# Patient Record
Sex: MALE | Race: WHITE | URBAN - METROPOLITAN AREA
[De-identification: names, ages, dates, MRNs, and addresses within clinical notes are randomized per-mention and may not be internally consistent; named-entity substitution may affect disease eponyms.]

---

## 2022-02-11 ENCOUNTER — INPATIENT (INPATIENT)
Facility: HOSPITAL | Age: 42
LOS: 4 days | Discharge: HOME | DRG: 200 | End: 2022-02-16
Attending: SURGERY | Admitting: SURGERY
Payer: COMMERCIAL

## 2022-02-11 VITALS — WEIGHT: 214.95 LBS | HEIGHT: 73 IN

## 2022-02-11 DIAGNOSIS — Z98.84 BARIATRIC SURGERY STATUS: Chronic | ICD-10-CM

## 2022-02-11 DIAGNOSIS — Z95.828 PRESENCE OF OTHER VASCULAR IMPLANTS AND GRAFTS: Chronic | ICD-10-CM

## 2022-02-11 DIAGNOSIS — J93.9 PNEUMOTHORAX, UNSPECIFIED: ICD-10-CM

## 2022-02-11 LAB
ALBUMIN SERPL ELPH-MCNC: 3.2 G/DL — LOW (ref 3.3–5)
ALBUMIN SERPL ELPH-MCNC: 3.2 G/DL — LOW (ref 3.3–5)
ALBUMIN SERPL ELPH-MCNC: 3.4 G/DL — SIGNIFICANT CHANGE UP (ref 3.3–5)
ALP SERPL-CCNC: 58 U/L — SIGNIFICANT CHANGE UP (ref 40–120)
ALP SERPL-CCNC: 64 U/L — SIGNIFICANT CHANGE UP (ref 40–120)
ALP SERPL-CCNC: 68 U/L — SIGNIFICANT CHANGE UP (ref 40–120)
ALT FLD-CCNC: 111 U/L — HIGH (ref 12–78)
ALT FLD-CCNC: 97 U/L — HIGH (ref 12–78)
ALT FLD-CCNC: 99 U/L — HIGH (ref 12–78)
ANION GAP SERPL CALC-SCNC: 5 MMOL/L — SIGNIFICANT CHANGE UP (ref 5–17)
ANION GAP SERPL CALC-SCNC: 6 MMOL/L — SIGNIFICANT CHANGE UP (ref 5–17)
ANION GAP SERPL CALC-SCNC: 6 MMOL/L — SIGNIFICANT CHANGE UP (ref 5–17)
APTT BLD: 26 SEC — LOW (ref 27.5–35.5)
AST SERPL-CCNC: 124 U/L — HIGH (ref 15–37)
AST SERPL-CCNC: 125 U/L — HIGH (ref 15–37)
AST SERPL-CCNC: 165 U/L — HIGH (ref 15–37)
BASOPHILS # BLD AUTO: 0.06 K/UL — SIGNIFICANT CHANGE UP (ref 0–0.2)
BASOPHILS NFR BLD AUTO: 0.9 % — SIGNIFICANT CHANGE UP (ref 0–2)
BILIRUB DIRECT SERPL-MCNC: 0.5 MG/DL — HIGH (ref 0–0.3)
BILIRUB DIRECT SERPL-MCNC: 0.6 MG/DL — HIGH (ref 0–0.3)
BILIRUB INDIRECT FLD-MCNC: 1.3 MG/DL — HIGH (ref 0.2–1)
BILIRUB INDIRECT FLD-MCNC: 1.4 MG/DL — HIGH (ref 0.2–1)
BILIRUB SERPL-MCNC: 1.8 MG/DL — HIGH (ref 0.2–1.2)
BILIRUB SERPL-MCNC: 2 MG/DL — HIGH (ref 0.2–1.2)
BILIRUB SERPL-MCNC: 2 MG/DL — HIGH (ref 0.2–1.2)
BUN SERPL-MCNC: 20 MG/DL — SIGNIFICANT CHANGE UP (ref 7–23)
BUN SERPL-MCNC: 20 MG/DL — SIGNIFICANT CHANGE UP (ref 7–23)
BUN SERPL-MCNC: 23 MG/DL — SIGNIFICANT CHANGE UP (ref 7–23)
CALCIUM SERPL-MCNC: 8.8 MG/DL — SIGNIFICANT CHANGE UP (ref 8.5–10.1)
CALCIUM SERPL-MCNC: 8.9 MG/DL — SIGNIFICANT CHANGE UP (ref 8.5–10.1)
CALCIUM SERPL-MCNC: 9.1 MG/DL — SIGNIFICANT CHANGE UP (ref 8.5–10.1)
CHLORIDE SERPL-SCNC: 102 MMOL/L — SIGNIFICANT CHANGE UP (ref 96–108)
CHLORIDE SERPL-SCNC: 102 MMOL/L — SIGNIFICANT CHANGE UP (ref 96–108)
CHLORIDE SERPL-SCNC: 104 MMOL/L — SIGNIFICANT CHANGE UP (ref 96–108)
CO2 SERPL-SCNC: 29 MMOL/L — SIGNIFICANT CHANGE UP (ref 22–31)
CO2 SERPL-SCNC: 31 MMOL/L — SIGNIFICANT CHANGE UP (ref 22–31)
CO2 SERPL-SCNC: 32 MMOL/L — HIGH (ref 22–31)
CREAT SERPL-MCNC: 0.86 MG/DL — SIGNIFICANT CHANGE UP (ref 0.5–1.3)
CREAT SERPL-MCNC: 0.91 MG/DL — SIGNIFICANT CHANGE UP (ref 0.5–1.3)
CREAT SERPL-MCNC: 0.91 MG/DL — SIGNIFICANT CHANGE UP (ref 0.5–1.3)
EOSINOPHIL # BLD AUTO: 0.07 K/UL — SIGNIFICANT CHANGE UP (ref 0–0.5)
EOSINOPHIL NFR BLD AUTO: 1.1 % — SIGNIFICANT CHANGE UP (ref 0–6)
GLUCOSE SERPL-MCNC: 95 MG/DL — SIGNIFICANT CHANGE UP (ref 70–99)
GLUCOSE SERPL-MCNC: 97 MG/DL — SIGNIFICANT CHANGE UP (ref 70–99)
GLUCOSE SERPL-MCNC: 97 MG/DL — SIGNIFICANT CHANGE UP (ref 70–99)
HCT VFR BLD CALC: 41.1 % — SIGNIFICANT CHANGE UP (ref 39–50)
HGB BLD-MCNC: 13.8 G/DL — SIGNIFICANT CHANGE UP (ref 13–17)
IMM GRANULOCYTES NFR BLD AUTO: 0.3 % — SIGNIFICANT CHANGE UP (ref 0–1.5)
INR BLD: 1.15 RATIO — SIGNIFICANT CHANGE UP (ref 0.88–1.16)
IRON SATN MFR SERPL: 31 % — SIGNIFICANT CHANGE UP (ref 16–55)
IRON SATN MFR SERPL: 97 UG/DL — SIGNIFICANT CHANGE UP (ref 45–165)
LYMPHOCYTES # BLD AUTO: 0.62 K/UL — LOW (ref 1–3.3)
LYMPHOCYTES # BLD AUTO: 9.5 % — LOW (ref 13–44)
MAGNESIUM SERPL-MCNC: 1.5 MG/DL — LOW (ref 1.6–2.6)
MAGNESIUM SERPL-MCNC: 1.7 MG/DL — SIGNIFICANT CHANGE UP (ref 1.6–2.6)
MCHC RBC-ENTMCNC: 33.5 PG — SIGNIFICANT CHANGE UP (ref 27–34)
MCHC RBC-ENTMCNC: 33.6 GM/DL — SIGNIFICANT CHANGE UP (ref 32–36)
MCV RBC AUTO: 99.8 FL — SIGNIFICANT CHANGE UP (ref 80–100)
MONOCYTES # BLD AUTO: 0.63 K/UL — SIGNIFICANT CHANGE UP (ref 0–0.9)
MONOCYTES NFR BLD AUTO: 9.7 % — SIGNIFICANT CHANGE UP (ref 2–14)
NEUTROPHILS # BLD AUTO: 5.1 K/UL — SIGNIFICANT CHANGE UP (ref 1.8–7.4)
NEUTROPHILS NFR BLD AUTO: 78.5 % — HIGH (ref 43–77)
PHOSPHATE SERPL-MCNC: 3.2 MG/DL — SIGNIFICANT CHANGE UP (ref 2.5–4.5)
PHOSPHATE SERPL-MCNC: 3.5 MG/DL — SIGNIFICANT CHANGE UP (ref 2.5–4.5)
PLATELET # BLD AUTO: 172 K/UL — SIGNIFICANT CHANGE UP (ref 150–400)
POTASSIUM SERPL-MCNC: 3 MMOL/L — LOW (ref 3.5–5.3)
POTASSIUM SERPL-MCNC: 3.2 MMOL/L — LOW (ref 3.5–5.3)
POTASSIUM SERPL-MCNC: 3.4 MMOL/L — LOW (ref 3.5–5.3)
POTASSIUM SERPL-SCNC: 3 MMOL/L — LOW (ref 3.5–5.3)
POTASSIUM SERPL-SCNC: 3.2 MMOL/L — LOW (ref 3.5–5.3)
POTASSIUM SERPL-SCNC: 3.4 MMOL/L — LOW (ref 3.5–5.3)
PROT SERPL-MCNC: 6.8 GM/DL — SIGNIFICANT CHANGE UP (ref 6–8.3)
PROT SERPL-MCNC: 7 GM/DL — SIGNIFICANT CHANGE UP (ref 6–8.3)
PROT SERPL-MCNC: 7.2 GM/DL — SIGNIFICANT CHANGE UP (ref 6–8.3)
PROTHROM AB SERPL-ACNC: 13.2 SEC — SIGNIFICANT CHANGE UP (ref 10.6–13.6)
RBC # BLD: 4.12 M/UL — LOW (ref 4.2–5.8)
RBC # FLD: 12.7 % — SIGNIFICANT CHANGE UP (ref 10.3–14.5)
SARS-COV-2 RNA SPEC QL NAA+PROBE: SIGNIFICANT CHANGE UP
SODIUM SERPL-SCNC: 139 MMOL/L — SIGNIFICANT CHANGE UP (ref 135–145)
TIBC SERPL-MCNC: 308 UG/DL — SIGNIFICANT CHANGE UP (ref 220–430)
UIBC SERPL-MCNC: 211 UG/DL — SIGNIFICANT CHANGE UP (ref 110–370)
WBC # BLD: 6.5 K/UL — SIGNIFICANT CHANGE UP (ref 3.8–10.5)
WBC # FLD AUTO: 6.5 K/UL — SIGNIFICANT CHANGE UP (ref 3.8–10.5)

## 2022-02-11 PROCEDURE — 99223 1ST HOSP IP/OBS HIGH 75: CPT | Mod: 25

## 2022-02-11 PROCEDURE — 99291 CRITICAL CARE FIRST HOUR: CPT

## 2022-02-11 PROCEDURE — 93005 ELECTROCARDIOGRAM TRACING: CPT

## 2022-02-11 PROCEDURE — 71045 X-RAY EXAM CHEST 1 VIEW: CPT

## 2022-02-11 PROCEDURE — 80076 HEPATIC FUNCTION PANEL: CPT

## 2022-02-11 PROCEDURE — 70496 CT ANGIOGRAPHY HEAD: CPT | Mod: 26,MG

## 2022-02-11 PROCEDURE — 32551 INSERTION OF CHEST TUBE: CPT

## 2022-02-11 PROCEDURE — 80048 BASIC METABOLIC PNL TOTAL CA: CPT

## 2022-02-11 PROCEDURE — 93010 ELECTROCARDIOGRAM REPORT: CPT

## 2022-02-11 PROCEDURE — 82962 GLUCOSE BLOOD TEST: CPT

## 2022-02-11 PROCEDURE — 74177 CT ABD & PELVIS W/CONTRAST: CPT | Mod: 26,ME

## 2022-02-11 PROCEDURE — G1004: CPT

## 2022-02-11 PROCEDURE — 71045 X-RAY EXAM CHEST 1 VIEW: CPT | Mod: 26

## 2022-02-11 PROCEDURE — 85027 COMPLETE CBC AUTOMATED: CPT

## 2022-02-11 PROCEDURE — 99252 IP/OBS CONSLTJ NEW/EST SF 35: CPT

## 2022-02-11 PROCEDURE — 83735 ASSAY OF MAGNESIUM: CPT

## 2022-02-11 PROCEDURE — 71260 CT THORAX DX C+: CPT | Mod: 26,MG

## 2022-02-11 PROCEDURE — 84100 ASSAY OF PHOSPHORUS: CPT

## 2022-02-11 PROCEDURE — 36415 COLL VENOUS BLD VENIPUNCTURE: CPT

## 2022-02-11 PROCEDURE — 80307 DRUG TEST PRSMV CHEM ANLYZR: CPT

## 2022-02-11 PROCEDURE — 70498 CT ANGIOGRAPHY NECK: CPT | Mod: 26,MG

## 2022-02-11 RX ORDER — PHENOBARBITAL 60 MG
130 TABLET ORAL ONCE
Refills: 0 | Status: DISCONTINUED | OUTPATIENT
Start: 2022-02-11 | End: 2022-02-11

## 2022-02-11 RX ORDER — THIAMINE MONONITRATE (VIT B1) 100 MG
100 TABLET ORAL DAILY
Refills: 0 | Status: DISCONTINUED | OUTPATIENT
Start: 2022-02-11 | End: 2022-02-16

## 2022-02-11 RX ORDER — MAGNESIUM SULFATE 500 MG/ML
2 VIAL (ML) INJECTION
Refills: 0 | Status: COMPLETED | OUTPATIENT
Start: 2022-02-11 | End: 2022-02-12

## 2022-02-11 RX ORDER — DEXMEDETOMIDINE HYDROCHLORIDE IN 0.9% SODIUM CHLORIDE 4 UG/ML
0.5 INJECTION INTRAVENOUS
Qty: 400 | Refills: 0 | Status: DISCONTINUED | OUTPATIENT
Start: 2022-02-11 | End: 2022-02-13

## 2022-02-11 RX ORDER — HYDROMORPHONE HYDROCHLORIDE 2 MG/ML
1 INJECTION INTRAMUSCULAR; INTRAVENOUS; SUBCUTANEOUS EVERY 4 HOURS
Refills: 0 | Status: DISCONTINUED | OUTPATIENT
Start: 2022-02-11 | End: 2022-02-16

## 2022-02-11 RX ORDER — OXYCODONE HYDROCHLORIDE 5 MG/1
5 TABLET ORAL EVERY 4 HOURS
Refills: 0 | Status: DISCONTINUED | OUTPATIENT
Start: 2022-02-11 | End: 2022-02-16

## 2022-02-11 RX ORDER — HEPARIN SODIUM 5000 [USP'U]/ML
5000 INJECTION INTRAVENOUS; SUBCUTANEOUS EVERY 8 HOURS
Refills: 0 | Status: DISCONTINUED | OUTPATIENT
Start: 2022-02-11 | End: 2022-02-16

## 2022-02-11 RX ORDER — ONDANSETRON 8 MG/1
4 TABLET, FILM COATED ORAL EVERY 6 HOURS
Refills: 0 | Status: DISCONTINUED | OUTPATIENT
Start: 2022-02-11 | End: 2022-02-16

## 2022-02-11 RX ORDER — ACETAMINOPHEN 500 MG
650 TABLET ORAL EVERY 6 HOURS
Refills: 0 | Status: DISCONTINUED | OUTPATIENT
Start: 2022-02-11 | End: 2022-02-12

## 2022-02-11 RX ORDER — PANTOPRAZOLE SODIUM 20 MG/1
40 TABLET, DELAYED RELEASE ORAL
Refills: 0 | Status: DISCONTINUED | OUTPATIENT
Start: 2022-02-11 | End: 2022-02-16

## 2022-02-11 RX ORDER — SODIUM CHLORIDE 9 MG/ML
1000 INJECTION, SOLUTION INTRAVENOUS
Refills: 0 | Status: DISCONTINUED | OUTPATIENT
Start: 2022-02-11 | End: 2022-02-13

## 2022-02-11 RX ORDER — ACETAMINOPHEN 500 MG
975 TABLET ORAL ONCE
Refills: 0 | Status: COMPLETED | OUTPATIENT
Start: 2022-02-11 | End: 2022-02-11

## 2022-02-11 RX ORDER — PHENOBARBITAL 60 MG
130 TABLET ORAL
Refills: 0 | Status: DISCONTINUED | OUTPATIENT
Start: 2022-02-11 | End: 2022-02-12

## 2022-02-11 RX ORDER — POTASSIUM CHLORIDE 20 MEQ
10 PACKET (EA) ORAL
Refills: 0 | Status: COMPLETED | OUTPATIENT
Start: 2022-02-11 | End: 2022-02-12

## 2022-02-11 RX ORDER — FOLIC ACID 0.8 MG
1 TABLET ORAL DAILY
Refills: 0 | Status: DISCONTINUED | OUTPATIENT
Start: 2022-02-11 | End: 2022-02-16

## 2022-02-11 RX ORDER — POTASSIUM CHLORIDE 20 MEQ
40 PACKET (EA) ORAL ONCE
Refills: 0 | Status: COMPLETED | OUTPATIENT
Start: 2022-02-11 | End: 2022-02-11

## 2022-02-11 RX ORDER — MAGNESIUM SULFATE 500 MG/ML
2 VIAL (ML) INJECTION
Refills: 0 | Status: DISCONTINUED | OUTPATIENT
Start: 2022-02-11 | End: 2022-02-11

## 2022-02-11 RX ORDER — SODIUM CHLORIDE 9 MG/ML
1000 INJECTION INTRAMUSCULAR; INTRAVENOUS; SUBCUTANEOUS ONCE
Refills: 0 | Status: COMPLETED | OUTPATIENT
Start: 2022-02-11 | End: 2022-02-11

## 2022-02-11 RX ADMIN — DEXMEDETOMIDINE HYDROCHLORIDE IN 0.9% SODIUM CHLORIDE 12.2 MICROGRAM(S)/KG/HR: 4 INJECTION INTRAVENOUS at 22:26

## 2022-02-11 RX ADMIN — Medication 4 MILLIGRAM(S): at 20:48

## 2022-02-11 RX ADMIN — SODIUM CHLORIDE 1000 MILLILITER(S): 9 INJECTION INTRAMUSCULAR; INTRAVENOUS; SUBCUTANEOUS at 20:47

## 2022-02-11 RX ADMIN — Medication 2 MILLIGRAM(S): at 17:50

## 2022-02-11 RX ADMIN — SODIUM CHLORIDE 2000 MILLILITER(S): 9 INJECTION INTRAMUSCULAR; INTRAVENOUS; SUBCUTANEOUS at 17:45

## 2022-02-11 RX ADMIN — Medication 975 MILLIGRAM(S): at 17:45

## 2022-02-11 RX ADMIN — Medication 2 MILLIGRAM(S): at 21:28

## 2022-02-11 RX ADMIN — Medication 40 MILLIEQUIVALENT(S): at 17:45

## 2022-02-11 RX ADMIN — Medication 4 MILLIGRAM(S): at 22:39

## 2022-02-11 RX ADMIN — Medication 100 MILLIEQUIVALENT(S): at 23:56

## 2022-02-11 RX ADMIN — SODIUM CHLORIDE 100 MILLILITER(S): 9 INJECTION, SOLUTION INTRAVENOUS at 22:45

## 2022-02-11 RX ADMIN — Medication 100 MILLIEQUIVALENT(S): at 22:45

## 2022-02-11 RX ADMIN — Medication 2 MILLIGRAM(S): at 19:46

## 2022-02-11 NOTE — ED PROVIDER NOTE - OBJECTIVE STATEMENT
40 y/o male with no pertinent PMHx presents to ED c/o HA and dizziness s/p head injury last night. Pt notes last night he slipped and fell. Pt hit his head on the tile floor. No LOC. No vomiting. Pt notes he fractured 2 ribs earlier this week. Pt notes he drinks often and has felt shaky. Last EtOH use 2 days ago. Pt reports hallucinations last night. No other complaints at this time. 42 y/o male with no pertinent PMHx presents to ED c/o HA and dizziness s/p head injury last night. Pt notes last night he slipped and fell. Pt hit his head on the tile floor. No LOC. No vomiting. Pt notes he fractured 2 ribs earlier this week. Pt notes he drinks often and has felt shaky. Last EtOH use 2 days ago. Pt reports hallucinations last night. No other complaints at this time including no fever, chill, saddle anesthesia, incontinence of urine or stool. no melena or hematochezia. Patent's family states he has had intermittent episodes of confusion where he is confused, only to return baseline a while later. No neck stiffness or pain.

## 2022-02-11 NOTE — ED PROVIDER NOTE - NSICDXFAMILYHX_GEN_ALL_CORE_FT
FAMILY HISTORY:  Father  Still living? Unknown  Family history of CVA, Age at diagnosis: Age Unknown  FH: CAD (coronary artery disease), Age at diagnosis: Age Unknown

## 2022-02-11 NOTE — ED PROVIDER NOTE - CROS ED NEURO NEG
no change in level of consciousness no difficulty walking/imbalance/no change in level of consciousness

## 2022-02-11 NOTE — H&P ADULT - ASSESSMENT
A/P:  Right traumatic pneumothorax  Right 7-9th rib fractures, displaced  ETOH abuse  CIWA  Thoracic surgery consult  S/P right pigtail chest tube placement in ER  SBIRT  GI/DVT prophylaxis  Pain control  Incentive spirometry  Monitor neurologic exams  F/U labs  Pt will be monitored for signs of evolution/resolution of pathology and surgical intervention as required and warranted  Pt aware of and agrees with all of the above

## 2022-02-11 NOTE — CONSULT NOTE ADULT - SUBJECTIVE AND OBJECTIVE BOX
Patient is a 41y old  Male who presents with a chief complaint of right pneumothorax (11 Feb 2022 18:37)      BRIEF HOSPITAL COURSE: ***    Events last 24 hours: ***    PAST MEDICAL & SURGICAL HISTORY:  No pertinent past medical history    H/O gastric bypass    S/P IVC filter      Allergies    No Known Allergies    Intolerances      FAMILY HISTORY:  FH: CAD (coronary artery disease) (Father)    Family history of CVA (Father)        Social History:     Review of Systems:  CONSTITUTIONAL: No fever, chills, or fatigue  EYES: No eye pain, visual disturbances, or discharge  ENMT:  No difficulty hearing, tinnitus, vertigo; No sinus or throat pain  NECK: No pain or stiffness  RESPIRATORY: No cough, wheezing, chills or hemoptysis; No shortness of breath  CARDIOVASCULAR: No chest pain, palpitations, dizziness, or leg swelling  GASTROINTESTINAL: No abdominal or epigastric pain. No nausea, vomiting, or hematemesis; No diarrhea or constipation. No melena or hematochezia.  GENITOURINARY: No dysuria, frequency, hematuria, or incontinence  NEUROLOGICAL: No headaches, memory loss, loss of strength, numbness, or tremors  SKIN: No itching, burning, rashes, or lesions   MUSCULOSKELETAL: No joint pain or swelling; No muscle, back, or extremity pain  PSYCHIATRIC: No depression, anxiety, mood swings, or difficulty sleeping      Vitals During Exam:   HR:   BP:   RR:  sPO2:     Physical Examination:    General: No acute distress.      HEENT: Pupils equal, reactive to light.  Symmetric.    PULM: Clear to auscultation bilaterally, no significant sputum production    CVS: Regular rate and rhythm, no murmurs, rubs, or gallops    ABD: Soft, nondistended, nontender, normoactive bowel sounds, no masses    EXT: No edema, nontender    SKIN: Warm and well perfused, no rashes noted.    NEURO: Alert, oriented, interactive, nonfocal      Medications:        acetaminophen     Tablet .. 650 milliGRAM(s) Oral every 6 hours PRN  dexMEDEtomidine Infusion 0.5 MICROgram(s)/kG/Hr IV Continuous <Continuous>  HYDROmorphone  Injectable 1 milliGRAM(s) IV Push every 4 hours PRN  LORazepam   Injectable 2 milliGRAM(s) IV Push every 2 hours PRN  LORazepam   Injectable   IV Push   LORazepam   Injectable 2 milliGRAM(s) IV Push every 1 hour PRN  LORazepam   Injectable 4 milliGRAM(s) IV Push every 4 hours  ondansetron Injectable 4 milliGRAM(s) IV Push every 6 hours PRN  oxyCODONE    IR 5 milliGRAM(s) Oral every 4 hours PRN      heparin   Injectable 5000 Unit(s) SubCutaneous every 8 hours    pantoprazole    Tablet 40 milliGRAM(s) Oral before breakfast        folic acid 1 milliGRAM(s) Oral daily  multivitamin 1 Tablet(s) Oral daily  thiamine 100 milliGRAM(s) Oral daily                ICU Vital Signs Last 24 Hrs  T(C): 36.8 (11 Feb 2022 14:19), Max: 36.8 (11 Feb 2022 14:19)  T(F): 98.2 (11 Feb 2022 14:19), Max: 98.2 (11 Feb 2022 14:19)  HR: 128 (11 Feb 2022 21:30) (93 - 128)  BP: 102/80 (11 Feb 2022 19:00) (102/80 - 146/106)  BP(mean): 89 (11 Feb 2022 19:00) (89 - 120)  ABP: --  ABP(mean): --  RR: 18 (11 Feb 2022 21:30) (15 - 19)  SpO2: 98% (11 Feb 2022 21:30) (98% - 100%)    Vital Signs Last 24 Hrs  T(C): 36.8 (11 Feb 2022 14:19), Max: 36.8 (11 Feb 2022 14:19)  T(F): 98.2 (11 Feb 2022 14:19), Max: 98.2 (11 Feb 2022 14:19)  HR: 128 (11 Feb 2022 21:30) (93 - 128)  BP: 102/80 (11 Feb 2022 19:00) (102/80 - 146/106)  BP(mean): 89 (11 Feb 2022 19:00) (89 - 120)  RR: 18 (11 Feb 2022 21:30) (15 - 19)  SpO2: 98% (11 Feb 2022 21:30) (98% - 100%)        I&O's Detail        LABS:                        13.8   6.50  )-----------( 172      ( 11 Feb 2022 15:08 )             41.1     02-11    139  |  102  |  20  ----------------------------<  97  3.0<L>   |  31  |  0.86    Ca    8.8      11 Feb 2022 20:33  Phos  3.5     02-11  Mg     1.5     02-11    TPro  6.8  /  Alb  3.2<L>  /  TBili  1.8<H>  /  DBili  0.5<H>  /  AST  125<H>  /  ALT  99<H>  /  AlkPhos  58  02-11          CAPILLARY BLOOD GLUCOSE        PT/INR - ( 11 Feb 2022 15:08 )   PT: 13.2 sec;   INR: 1.15 ratio         PTT - ( 11 Feb 2022 15:08 )  PTT:26.0 sec    CULTURES:        RADIOLOGY: ***      SUPPLEMENTAL O2:   LINES:  IVF:  TRUPTI:   PPx:   CONTACT: 40 yo m pmhx ETOH abuse (drinks ~1.5L vodka daily), recent fall resulting in rib fx (~1 week ago) presented from home with complaints of ha/dizziness/hallucinations.  Patient endorses he fell at home last night and hit his head.  Denied loc/nausea/vomiting.  Today patient endorsed the ha progressed and per family so did the hallucinations prompting presentation.  In ED patient found to have 40% right ptx and had chest tube placed.  In ED patient with withdrawal symptoms, given ativan 2mg IVP x2 since presentation.  Initially called by ED team to evaluate from medical stand poin for withdrawal management, patient seen and was confused but calm, started on high risk ativan taper.  Patient not a reliable source, unable to tell me when his last drink was, per ED/[place]/trauma documentation patient's last drink was 2/9 or today. No etoh level drawn upon presentation.  Stat ETOH level/drug screen just ordered.   Called back by surgical team 2/2 patient with CIWA >18, agitation, climbing out of bed, confused, combative despite receiving additional ativan 4mg ivp and now in soft vest/bilateral wrist restraints.  Patient upgraded to ICU and placed on precedex gtt.        PAST MEDICAL & SURGICAL HISTORY:  No pertinent past medical history  H/O gastric bypass  S/P IVC filter      Allergies  No Known Allergies      FAMILY HISTORY:  FH: CAD (coronary artery disease) (Father)  Family history of CVA (Father)      Social History:   ETOH abuse       Review of Systems:  Unable to obtain 2/2 clinical status       Physical Examination: limited by body habitus    General: Adult male, lying in bed, agitated      HEENT: surgical mask in place    PULM: Symmetrical thorax expansion upon respiration. Grossly clear to auscultation bilaterally, no significant sputum production, right ct in place.    CVS: Regular rate and rhythm, no murmurs, rubs, or gallops    ABD: Soft, nondistended, nontender, normoactive bowel sounds, no masses appreciated    EXT: No edema, nontender    SKIN: Warm     NEURO: agitated, confused      Medications:  acetaminophen     Tablet .. 650 milliGRAM(s) Oral every 6 hours PRN  dexMEDEtomidine Infusion 0.5 MICROgram(s)/kG/Hr IV Continuous <Continuous>  HYDROmorphone  Injectable 1 milliGRAM(s) IV Push every 4 hours PRN  LORazepam   Injectable 2 milliGRAM(s) IV Push every 2 hours PRN  LORazepam   Injectable   IV Push   LORazepam   Injectable 2 milliGRAM(s) IV Push every 1 hour PRN  LORazepam   Injectable 4 milliGRAM(s) IV Push every 4 hours  ondansetron Injectable 4 milliGRAM(s) IV Push every 6 hours PRN  oxyCODONE    IR 5 milliGRAM(s) Oral every 4 hours PRN  heparin   Injectable 5000 Unit(s) SubCutaneous every 8 hours  pantoprazole    Tablet 40 milliGRAM(s) Oral before breakfas  folic acid 1 milliGRAM(s) Oral daily  multivitamin 1 Tablet(s) Oral daily  thiamine 100 milliGRAM(s) Oral daily      ICU Vital Signs Last 24 Hrs  T(C): 36.8 (11 Feb 2022 14:19), Max: 36.8 (11 Feb 2022 14:19)  T(F): 98.2 (11 Feb 2022 14:19), Max: 98.2 (11 Feb 2022 14:19)  HR: 128 (11 Feb 2022 21:30) (93 - 128)  BP: 102/80 (11 Feb 2022 19:00) (102/80 - 146/106)  BP(mean): 89 (11 Feb 2022 19:00) (89 - 120)  ABP: --  ABP(mean): --  RR: 18 (11 Feb 2022 21:30) (15 - 19)  SpO2: 98% (11 Feb 2022 21:30) (98% - 100%)    Vital Signs Last 24 Hrs  T(C): 36.8 (11 Feb 2022 14:19), Max: 36.8 (11 Feb 2022 14:19)  T(F): 98.2 (11 Feb 2022 14:19), Max: 98.2 (11 Feb 2022 14:19)  HR: 128 (11 Feb 2022 21:30) (93 - 128)  BP: 102/80 (11 Feb 2022 19:00) (102/80 - 146/106)  BP(mean): 89 (11 Feb 2022 19:00) (89 - 120)  RR: 18 (11 Feb 2022 21:30) (15 - 19)  SpO2: 98% (11 Feb 2022 21:30) (98% - 100%)      LABS:                        13.8   6.50  )-----------( 172      ( 11 Feb 2022 15:08 )             41.1     02-11    139  |  102  |  20  ----------------------------<  97  3.0<L>   |  31  |  0.86    Ca    8.8      11 Feb 2022 20:33  Phos  3.5     02-11  Mg     1.5     02-11    TPro  6.8  /  Alb  3.2<L>  /  TBili  1.8<H>  /  DBili  0.5<H>  /  AST  125<H>  /  ALT  99<H>  /  AlkPhos  58  02-11      PT/INR - ( 11 Feb 2022 15:08 )   PT: 13.2 sec;   INR: 1.15 ratio    PTT - ( 11 Feb 2022 15:08 )  PTT:26.0 sec      RADIOLOGY:   < from: CT Chest w/ IV Cont (02.11.22 @ 16:52) >  ACC: [id number] EXAM:  CT ABDOMEN AND PELVIS IC                        ACC: [id number] EXAM:  CT CHEST IC                          PROCEDURE DATE:  02/11/2022      INTERPRETATION:  CLINICAL INFORMATION: Mid abdominal pain and flank pain.   Status post fall.    COMPARISON: None.    CONTRAST/COMPLICATIONS:  IV Contrast: Omnipaque 350 (accession [id number]), IV contrast documented   in associated exam (accession [id number])  90 cc administered   10 cc   discarded  Oral Contrast: NONE  Complications: None reported at time of study completion    PROCEDURE:  CT of the Chest, Abdomen and Pelvis was performed.  Sagittal and coronal reformats were performed.    FINDINGS:  CHEST:  LUNGS AND LARGE AIRWAYS: Moderate-sized right pneumothorax. Areas of   atelectasis involving the right middle lobe and right lower lobe. Trace   right pleural effusion which demonstrates nonsimple fluid and may   represent a small amount of hemothorax.  VESSELS: Within normal limits.  HEART: Heart size is normal. No pericardial effusion.  MEDIASTINUM AND JASON: Moderate pneumomediastinum extends into the soft   tissues of the visualized portion of the lower neck.  CHEST WALL AND LOWER NECK: Within normal limits.    ABDOMEN AND PELVIS:  LIVER: Diffuse fatty infiltration.  BILE DUCTS: Normal caliber.  GALLBLADDER: Within normal limits.  SPLEEN: Within normal limits.  PANCREAS: Within normal limits.  ADRENALS: Within normal limits.  KIDNEYS/URETERS: Within normal limits.    BLADDER: Within normal limits.  REPRODUCTIVE ORGANS: Prostate within normal limits.    BOWEL: No bowel obstruction. Addis-en-Y gastric bypass.  PERITONEUM: No ascites. Coarse benign calcification left upper quadrant.  VESSELS: There is an IVC filter.  RETROPERITONEUM/LYMPH NODES: No lymphadenopathy.  ABDOMINAL WALL: Within normal limits.  BONES: Displaced right seventh, eighth and ninth rib fractures.    Findings of right pneumothorax and pneumomediastinum were discussed by   Dr. Beltran with Dr. Dr. Ulrich on 2/11/22 at 5:07 pm.    IMPRESSION:  Moderate size right pneumothorax. Trace amount of mildly complex right   pleural fluid suggesting trace amount of associated hemothorax.    Pneumomediastinum. If there is a clinical concern for esophageal injury,   further evaluation may be performed with chest CT with oral contrast or   esophagram.    Displaced right seventh, eighth and ninth rib fractures.    No acute abnormality in the abdomen or pelvis.    --- End of Report ---    SACHIN MADRIGAL MD; Attending Radiologist  This document has been electronically signed. Feb 11 2022  5:47PM    < end of copied text >    < from: CT Angio Head w/ IV Cont (02.11.22 @ 16:44) >  ACC: [id number] EXAM:  CT ANGIO NECK (W)AW IC                        ACC: [id number] EXAM:  CT ANGIO BRAIN (W)AW IC                          PROCEDURE DATE:  02/11/2022      INTERPRETATION:  CT ANGIO BRAIN WITHOUT AND OR WITH IV CONTRAST, CT ANGIO   NECK WITHOUT AND OR WITH IV CONTRAST    CLINICAL HISTORY: Dizziness, persistent/recurrent, cardiac or vascular   cause suspected  disequilib    CT HEAD TECHNIQUE:  Noncontrast CT.  Axial Acqisition. Sagittal and coronal reformations    COMPARISON:  No prior studies for comparison    FINDINGS:  *  HEMORRHAGE:  No evidence of intracranial hemorrhage.  *  BRAIN PARENCHYMA: Mild atrophy. Minimal periventricular white matter   ischemic changes No abnormal regions of parenchymal attenuation. No mass   or mass effect.  *  VENTRICLES / SHIFT:  No hydrocephalus. No midline shift.  *  EXTRA-AXIAL / BASAL CISTERNS:  No extra-axial mass. Basal cisterns   preserved.  *  CALVARIUM AND EXTRACRANIAL SOFT TISSUES:  No depressed calvarial   fracture.  *  SINUSES, ORBITS, MASTOIDS:  Regions of mucosal thickening/small   retention cysts within the maxillary sinuses.    CTA TECHNIQUE:  CT angiography of the neck and brain was performed during the dynamic   administration of intravenous contrast.  MIP reconstructions were   performed and reviewed. Multiplanar reformations were obtained.  Contrast administered 90 cc Omnipaque 350, contrast discarded 10 cc    CTA FINDINGS:  Suboptimal contrast bolus  NECK  RIGHT CAROTID CIRCULATION:  Evaluation of the rightcarotid circulation demonstrates no hemodynamic   significant narrowing utilizing a distal reference.    LEFT CAROTID CIRCULATION:  Evaluation of the left carotid circulation demonstrates no hemodynamic   significant narrowing utilizing a distal reference.    VERTEBRAL ARTERIES:  Evaluation of the vertebral arteries reveals no evidence of a vertebral   artery occlusion or dissection.    BRAIN  ANTERIOR CIRCULATION:  Distal internal carotid arteries are patent.  Anterior cerebral arteries are patent.  Middle cerebral arteries are patent. There is an early trifurcation of   the right middle cerebral artery    POSTERIOR CIRCULATION:  Distal vertebral arteries are patent. Bilateral posterior inferior   cerebellar arteries are seen  Basilar arteryis patent. Proximal superior cerebellar arteries are patent  Posterior cerebral arteries are patent.    OTHER:  There is a moderate right-sided pneumothorax.  Pneumomediastinum is seen extending into the soft tissues of the neck.  There is a calculuswithin the right submandibular duct measured 1.1 x   0.8 cm    IMPRESSION:  NO EVIDENCE OF AN ACUTE INTRACRANIAL HEMORRHAGE, MIDLINE SHIFT, OR   HYDROCEPHALUS  NO HEMODYNAMIC SIGNIFICANT NARROWING WITHIN THE NECK.  NO PROXIMAL LARGE VESSEL OCCLUSION INTRACRANIALLY.  MODERATE RIGHT-SIDED PNEUMOTHORAX. ASSOCIATED PNEUMOMEDIASTINUM.  CALCULUS WITHIN THE RIGHT SUBMANDIBULAR DUCT    FINDINGS DISCUSSED WITH DR. ULRICH AT 5:07 PM ON 2/11/2022    --- End of Report ---    HEIDI OZUNA MD; Attending Radiologist  This document has been electronically signed. Feb 11 2022  5:16PM    < end of copied text >        SUPPLEMENTAL O2: RA  LINES: Peripheral   IVF: LR  LYLES: N  PPx: Heparin  CONTACT: N

## 2022-02-11 NOTE — ED PROVIDER NOTE - CARDIAC, MLM
Normal rate, regular rhythm.  Heart sounds S1, S2.  No murmurs, rubs or gallops. Normal rate, regular rhythm.  Heart sounds S1, S2.  No rubs or gallops. 2+ pulses in bilateral dp and radial arteries. Cap refill less than 2 seconds.

## 2022-02-11 NOTE — ED PROVIDER NOTE - CRITICAL CARE ATTENDING CONTRIBUTION TO CARE
history and physical exam  discussion of patient's presentation with family (with patient's consent to discuss)  discussion of patient's care with specialists (surgery, thoracic, ICU)  discussion of images with radiology (as part of code stroke eval)  coordination of care with specialists, updating patient and family with results of labs and imaging as well interpretation of labs and imaging.

## 2022-02-11 NOTE — H&P ADULT - HISTORY OF PRESENT ILLNESS
Trauma Consult, notified 2/11/22 510pm, attending bedside 530pm  Pt s/p mechanical fall at work 2/7/22, was seen at [facility] that evening, found to have right rib fractures. Pt was prescribed narcotics for pain control. Pt stated he felt dizzy and "fell" last night, 2/10/22, with c/o headstrike no LOC. Pt c/o right sided rib/chest pain, difficulty with taking deep breaths/catching breath secondary to pain.   Pt states chronic etoh consumption, last drink 2/9/22.    Pt seen and examined at bedside with chaperone. Pt is AAOx3, pt in no acute distress. Pt denied c/o fever, chills, abd pain, N/V/D, extremity pain or dysfunction, hemoptysis, hematemesis, hematuria, hematochexia, headache, diplopia, vertigo.

## 2022-02-11 NOTE — ED PROVIDER NOTE - CARE PLAN
1 Principal Discharge DX:	Pneumothorax  Goal:	PTX, rib fx, ETOH use, s/p pig tail catheter   Principal Discharge DX:	Pneumothorax  Goal:	PTX, rib fx, ETOH use, s/p pig tail catheter, no tPA (no severe neurological deficits, no acute deficits, likely ETOH withdrawal vs. DT)

## 2022-02-11 NOTE — PHARMACOTHERAPY INTERVENTION NOTE - COMMENTS
Medication reconciliation completed.  Reviewed Medication list and confirmed med allergies with patient; confirmed with Dr. [facility].  Pt claims that he did not take any medication at home today.

## 2022-02-11 NOTE — ED PROVIDER NOTE - NS_ ATTENDINGSCRIBEDETAILS _ED_A_ED_FT
I Escobar Licea MD saw and examined the patient. Scribe documented for me and under my supervision. I have modified the scribe's documentation where necessary to reflect my history, physical exam and other relevant documentations pertinent to the care of the patient.

## 2022-02-11 NOTE — H&P ADULT - NSHPLABSRESULTS_GEN_ALL_CORE
Labs:                      13.8   6.50  )-----------( 172      ( 11 Feb 2022 15:08 )             41.1   CBC Full  -  ( 11 Feb 2022 15:08 )  WBC Count : 6.50 K/uL  RBC Count : 4.12 M/uL  Hemoglobin : 13.8 g/dL  Hematocrit : 41.1 %  Platelet Count - Automated : 172 K/uL  Mean Cell Volume : 99.8 fl  Mean Cell Hemoglobin : 33.5 pg  Mean Cell Hemoglobin Concentration : 33.6 gm/dL  Auto Neutrophil # : 5.10 K/uL  Auto Lymphocyte # : 0.62 K/uL  Auto Monocyte # : 0.63 K/uL  Auto Eosinophil # : 0.07 K/uL  Auto Basophil # : 0.06 K/uL  Auto Neutrophil % : 78.5 %  Auto Lymphocyte % : 9.5 %  Auto Monocyte % : 9.7 %  Auto Eosinophil % : 1.1 %  Auto Basophil % : 0.9 %  139  |  102  |  23  ----------------------------<  95  3.2<L>   |  32<H>  |  0.91  Ca    9.1      11 Feb 2022 15:08  TPro  7.2  /  Alb  3.4  /  TBili  2.0<H>  /  DBili  x   /  AST  165<H>  /  ALT  111<H>  /  AlkPhos  68  02-11  LIVER FUNCTIONS - ( 11 Feb 2022 15:08 )  Alb: 3.4 g/dL / Pro: 7.2 gm/dL / ALK PHOS: 68 U/L / ALT: 111 U/L / AST: 165 U/L / GGT: x         PT/INR - ( 11 Feb 2022 15:08 )   PT: 13.2 sec;   INR: 1.15 ratio    PTT - ( 11 Feb 2022 15:08 )  PTT:26.0 sec    RADIOLOGY:    ACC: [id number] EXAM:  CT ABDOMEN AND PELVIS IC                        ACC: [id number] EXAM:  CT CHEST IC                        PROCEDURE DATE:  02/11/2022    INTERPRETATION:  CLINICAL INFORMATION: Mid abdominal pain and flank pain.   Status post fall.  COMPARISON: None.  IMPRESSION:  Moderate size right pneumothorax. Trace amount of mildly complex right   pleural fluid suggesting trace amount of associated hemothorax.  Pneumomediastinum. If there is a clinical concern for esophageal injury,   further evaluation may be performed with chest CT with oral contrast or   esophagram.  Displaced right seventh, eighth and ninth rib fractures.  No acute abnormality in the abdomen or pelvis.    ACC: [id number] EXAM:  CT ANGIO NECK (W)[facility]                        ACC: [id number] EXAM:  CT ANGIO BRAIN (W)[facility]                        PROCEDURE DATE:  02/11/2022    INTERPRETATION:  CT ANGIO BRAIN WITHOUT AND OR WITH IV CONTRAST, CT ANGIO   NECK WITHOUT AND OR WITH IV CONTRAST  CLINICAL HISTORY: Dizziness, persistent/recurrent, cardiac or vascular   cause suspected  disequilib  IMPRESSION:  NO EVIDENCE OF AN ACUTE INTRACRANIAL HEMORRHAGE, MIDLINE SHIFT, OR   HYDROCEPHALUS  NO HEMODYNAMIC SIGNIFICANT NARROWING WITHIN THE NECK.  NO PROXIMAL LARGE VESSEL OCCLUSION INTRACRANIALLY.  MODERATE RIGHT-SIDED PNEUMOTHORAX. ASSOCIATED PNEUMOMEDIASTINUM.  CALCULUS WITHIN THE RIGHT SUBMANDIBULAR DUCT

## 2022-02-11 NOTE — ED PROVIDER NOTE - NSICDXPASTMEDICALHX_GEN_ALL_CORE_FT
PAST MEDICAL HISTORY:  No pertinent past medical history PAST MEDICAL HISTORY:  Admits to alcohol use

## 2022-02-11 NOTE — H&P ADULT - NSHPPHYSICALEXAM_GEN_ALL_CORE
GCS of 15  Airway is patent  Breathing is symmetric and unlabored  Neuro: CNII-XII grossly intact  Psych: normal affect  HEENT: Normocephalic, atraumatic, ANAI, EOM wnl, no otorrhea or hemotympanum b/l, no epistaxis or d/c b/l nares, no craniofacial bony pathology or tenderness b/l  Neck: Soft and supple, nontender to passive/active exam. No crepitus, no ecchymosis, no hematoma, to exam, no JVD, no tracheal deviation  Cspine/thoracolumbrosacral spine: no gross bony pathology or tenderness to exam  Cardiovascular: S1S2 Present  Chest: no gross left rib pathology or tenderness to exam. + right 7-10th rib tenderness from known right 7-9th rib fractures, + small amount of crepitus to region. No sternal pathology or tenderness to exam. No no ecchymosis, no hematoma. No penetrating thoracoabdominal trauma  Respiratory: Rate is 18; Respiratory Effort normal; no wheezes, rales or rhonchi to exam  ABD: bowel sounds (+), soft, nontender, non distended, no rebound, no guarding, no rigidity, no skin changes to exam. No pelvic instability to exam, no skin changes  Rectal: anal sphincter tone normal, guiac negative  Musculoskeletal: Pt has palpable b/l radial, femoral, dorsalis pedis pulses. All digits are warm and well perfused. No gross long bone pathology or tenderness to exam. Pt demonstrates grossly intact sensoromotor function. Pt has good capillary refill to digits, no calf edema or tenderness to exam.  Skin: + venous stasis changes and varicosities to b/l lower ext  ICU Vital Signs Last 24 Hrs  T(C): 36.8 (11 Feb 2022 14:19), Max: 36.8 (11 Feb 2022 14:19)  T(F): 98.2 (11 Feb 2022 14:19), Max: 98.2 (11 Feb 2022 14:19)  HR: 104 (11 Feb 2022 14:19) (104 - 104)  BP: 136/86 (11 Feb 2022 14:19) (136/86 - 136/86)  BP(mean): 101 (11 Feb 2022 14:19) (101 - 101)  ABP: --  ABP(mean): --  RR: 19 (11 Feb 2022 14:19) (19 - 19)  SpO2: 99% (11 Feb 2022 14:19) (99% - 99%)

## 2022-02-11 NOTE — CONSULT NOTE ADULT - ASSESSMENT
40 y/o male presents to the ED c/o HA and dizziness s/p head injury last night. Pt notes last night he slipped and fell. Pt hit his head on tile floor. No LOC. No vomiting. Pt notes he fractured 2 ribs earlier this week now with + moderate Rt PTX s/p pigtail by trauma      Plan   Maintain Pigtail to suction  CXR in am   NTD for rib fractures   DR Rios to review case   
41 year old man s/p fall no LOC, alcoholic. non focal exam. r/o withdrawal, medication effects, oxy + alcohol.  Suggests:  thiamine, MVI  CT head wo  watch for Dts.  
40 yo m pmhx ETOH abuse (drinks ~1.5L vodka daily), recent fall resulting in rib fx (~1 week ago) admitted with     1. ETOH withdrawal  2. Traumatic right PTX    NEURO: DTs on high risk ativan taper, ciwa triggered prn IVP ativan and precedex for improved symptomology control   CV: Tachycardia 2/2 withdrawal, supportive care.    RESP: Right ptx s/p ct with reexpansion, continuos suction set to -20. Keep HOB >30 degrees, aspiration precautions   RENAL: Monitor lytes, replace as needed.  Hypokalemia and hypomagnesemia, supplementation ordered  GI: NPO except meds as tolerated 2/2 DTs  ENDO: POCT q6hr while NPO   ID: No active issues  HEME: Heparinf or vte ppx   DISPO: Full code.      Discussed with eICU Dr. Guillory.

## 2022-02-11 NOTE — PATIENT PROFILE ADULT - FALL HARM RISK - HARM RISK INTERVENTIONS
Assistance with ambulation/Assistance OOB with selected safe patient handling equipment/Communicate Risk of Fall with Harm to all staff/Discuss with provider need for PT consult/Monitor for mental status changes/Monitor gait and stability/Move patient closer to nurses' station/Reinforce activity limits and safety measures with patient and family/Reorient to person, place and time as needed/Review medications for side effects contributing to fall risk/Sit up slowly, dangle for a short time, stand at bedside before walking/Tailored Fall Risk Interventions/Toileting schedule using arm’s reach rule for commode and bathroom/Use of alarms - bed, chair and/or voice tab/Visual Cue: Yellow wristband and red socks/Bed in lowest position, wheels locked, appropriate side rails in place/Call bell, personal items and telephone in reach/Instruct patient to call for assistance before getting out of bed or chair/Non-slip footwear when patient is out of bed/[place] to call system/Physically safe environment - no spills, clutter or unnecessary equipment/Purposeful Proactive Rounding/Room/bathroom lighting operational, light cord in reach

## 2022-02-11 NOTE — ED PROVIDER NOTE - NEUROLOGICAL, MLM
Alert and oriented, no focal deficits, no motor or sensory deficits. Alert and oriented, no focal deficits, no motor or sensory deficits. NIH=0 GCS=15 No saddle anesthesia. No nuchal rigidity. No aphasia, dysarthria or dysphonia.

## 2022-02-11 NOTE — ED ADULT TRIAGE NOTE - CHIEF COMPLAINT QUOTE
pt c/o headache and dizziness. reports falling last night, tripped and fell from standing height hitting back of head against tile floor, denies LOC, denies anticoagulant use.

## 2022-02-11 NOTE — ED ADULT NURSE REASSESSMENT NOTE - NS ED NURSE REASSESS COMMENT FT1
Received handoff.  Pt with obvious auditory and visual hallucinations. GF states pts last drink was 2 days ago, she also reports that he has been having mild hallucinations since Tuesday. Pt states he drinks pint and a half of vodka daily but has been tapering down over the last 4 weeks.  MD Licea aware.  New orders placed.

## 2022-02-11 NOTE — PATIENT PROFILE ADULT - FALL HARM RISK - FACTORS
CIWA protocol initiation less than 96 hours/IV and/or equipment tethered to patient/Medication side effects/Poor balance/Weakness

## 2022-02-11 NOTE — ED PROVIDER NOTE - PLAN OF CARE
PTX, rib fx, ETOH use, s/p pig tail catheter PTX, rib fx, ETOH use, s/p pig tail catheter, no tPA (no severe neurological deficits, no acute deficits, likely ETOH withdrawal vs. DT)

## 2022-02-11 NOTE — CHART NOTE - NSCHARTNOTEFT_GEN_A_CORE
Called by ed attending for patient to be placed in sicu, patient with etoh withdrawal, has received total 4mg ivp ativan since presentation.  Patient seen at bedside, calm but confused, family at bedside, ordered for high dose ativan taper per ciwa protocol with prn ivp.  Patient stable for placement in sicu.

## 2022-02-11 NOTE — CONSULT NOTE ADULT - SUBJECTIVE AND OBJECTIVE BOX
Surgeon: Gabriel    Consult requesting by: ER     HISTORY OF PRESENT ILLNESS:  40 y/o male presents to the ED c/o HA and dizziness s/p head injury last night. Pt notes last night he slipped and fell. Pt hit his head on tile floor. No LOC. No vomiting. Pt notes he fractured 2 ribs earlier this week. Pt notes he drinks often and has felt shaky. Last EtOH use 2 days ago. Pt reports hallucinations last night. No other complaints at this time.  Noted moderate Rt PTX on CT scan. s/p Pigtail placement by trauma service .  Pt is stable on RA  no airleak appreciated on CT     PAST MEDICAL & SURGICAL HISTORY:  No pertinent past medical history        MEDICATIONS  (STANDING):    MEDICATIONS  (PRN):    Antiplatelet therapy:  none                          Last dose/amt:    Allergies    No Known Allergies    Intolerances        SOCIAL HISTORY:  Smoker: [ ] Yes  [ ] No        PACK YEARS:                         WHEN QUIT?  ETOH use: [x ] Yes  [ ] No              FREQUENCY / QUANTITY:  Ilicit Drug use:  [ ] Yes  [ ] No  Occupation:  Live with: Wife   Assisted device use: no     FAMILY HISTORY:      Review of Systems  CONSTITUTIONAL:  Fevers[ ] chills[ ] sweats[ ] fatigue[ ] weight loss[ ] weight gain [ ]                                     NEGATIVE [ x]   NEURO:  parathesias[ ] seizures [ ]  syncope [ ]  confusion [ ]                                                                                NEGATIVE[x ]   EYES: glasses[ ]  blurry vision[ ]  discharge[ ] pain[ ] glaucoma [ ]                                                                          NEGATIVE[x ]   ENMT:  difficulty hearing [ ]  vertigo[ ]  dysphagia[ ] epistaxis[ ] recent dental work [ ]                                    NEGATIVE[ ]   CV:  chest pain[x ] palpitations[ ] GOFF [ ] diaphoresis [ ]                                                                                           NEGATIVE[ ]   RESPIRATORY:  wheezing[ ] SOB[ ] cough [ ] sputum[ ] hemoptysis[ ]                                                                  NEGATIVE[x   GI:  nausea[ ]  vomiting [ ]  diarrhea[ ] constipation [ ] melena [ ]                                                                      NEGATIVE[ x]   : hematuria[ ]  dysuria[ ] urgency[ ] incontinence[ ]                                                                                            NEGATIVE[x   MUSCULOSKELETAL  arthritis[ ]  joint swelling [ ] muscle weakness [ ]                                                                NEGATIVE[ x]   SKIN/BREAST:  rash[ ] itching [ ]  hair loss[ ] masses[ ]                                                                                              NEGATIVE[x ]   PSYCH:  dementia [ ] depression [ ] anxiety[ ]                                                                                                               NEGATIVE[x ]   HEME/LYMPH:  bruises easily[ ] enlarged lymph nodes[ ] tender lymph nodes[ ]                                               NEGATIVE[ x]   ENDOCRINE:  cold intolerance[ ] heat intolerance[ ] polydipsia[ ]                                                                          NEGATIVE[ x]     PHYSICAL EXAM  Vital Signs Last 24 Hrs  T(C): 36.8 (11 Feb 2022 14:19), Max: 36.8 (11 Feb 2022 14:19)  T(F): 98.2 (11 Feb 2022 14:19), Max: 98.2 (11 Feb 2022 14:19)  HR: 104 (11 Feb 2022 14:19) (104 - 104)  BP: 136/86 (11 Feb 2022 14:19) (136/86 - 136/86)  BP(mean): 101 (11 Feb 2022 14:19) (101 - 101)  RR: 19 (11 Feb 2022 14:19) (19 - 19)  SpO2: 99% (11 Feb 2022 14:19) (99% - 99%)    CONSTITUTIONAL:                                                                          WNL[x ]   Neuro: WNL[x ] Normal exam oriented to person/place & time with no focal motor or sensory  deficits. Other                     Eyes: WNL[x ]   Normal exam of conjunctiva & lids, pupils equally reactive. Other     ENT: WNL[x ]    Normal exam of nasal/oral mucosa with absence of cyanosis. Other  Neck: WNL[ x]  Normal exam of jugular veins, trachea & thyroid. Other  Chest: WNL[ x] Normal lung exam with good air movement absence of wheezes, rales, or rhonchi: Other + RT Pigtail                                                                                 CV:  Auscultation: normal [ x] S3[ ] S4[ ] Irregular [ ] Rub[ ] Clicks[ ]    Murmurs none:[x ]systolic [ ]  diastolic [ ] holosystolic [ ]  Carotids: No Bruits[ x] Other                 Abdominal Aorta: normal [ ] nonpalpable[x ]Other                                                                                      GI:           WNL[ x] Normal exam of abdomen, liver & spleen with no noted masses or tenderness. Other                                                                                                        Extremities: WNL[x ] Normal no evidence of cyanosis or deformity Edema: none[x ]trace[ ]1+[ ]2+[ ]3+[ ]4+[ ]  Lower Extremity Pulses: Right2+[ ] Left[2+ ]Varicosities[ ]  SKIN :WNL[ ] Normal exam to inspection & palation. Other:                                                          LABS:                        13.8   6.50  )-----------( 172      ( 11 Feb 2022 15:08 )             41.1     02-11    139  |  102  |  23  ----------------------------<  95  3.2<L>   |  32<H>  |  0.91    Ca    9.1      11 Feb 2022 15:08    TPro  7.2  /  Alb  3.4  /  TBili  2.0<H>  /  DBili  x   /  AST  165<H>  /  ALT  111<H>  /  AlkPhos  68  02-11    PT/INR - ( 11 Feb 2022 15:08 )   PT: 13.2 sec;   INR: 1.15 ratio         PTT - ( 11 Feb 2022 15:08 )  PTT:26.0 sec          < from: CT Chest w/ IV Cont (02.11.22 @ 16:52) >  IMPRESSION:  Moderate size right pneumothorax. Trace amount of mildly complex right   pleural fluid suggesting trace amount of associated hemothorax.    Pneumomediastinum. If there is a clinical concern for esophageal injury,   further evaluation may be performed with chest CT with oral contrast or   esophagram.    Displaced right seventh, eighth and ninth rib fractures.    No acute abnormality in the abdomen or pelvis.    < end of copied text >

## 2022-02-11 NOTE — ED PROVIDER NOTE - PROGRESS NOTE DETAILS
Jelly Suero for attending Dr. Licea   Spoke to Dr. Barrientos. Radiologist states 40 percent thoracic on right. No evidence of shift. Pt is nontoxic appearing. Dr. Barrientos to see pt and place pigtail catheter. Request thoracic consult Spoke to TAVO Franco from Dr. Perez, trauma and thoracic consult is appreciated. Jelly Suero for attending Dr. Anuja Barrientos placed chest tube. Pt to be admitted for surgical evaluation. Jelly Suero for attending Dr. Licea   Admission to surgical ICU. Spoke to ICU TAVO Hurley, consult appreciated. Jelly Suero for attending Dr. Anuja Barrientos placed chest tube. Pt to be admitted for surgical evaluation. Surgery's timely intervention appreciated. Patient with no focal neurological complaints, NIH=0, GCS-15, hx of alcohol use with cessation, delirium tremens vs. traumatic PTX from ETOH use more likely. no tPA indicated, code stroke initially called by Dr. Cruz, acknowledged radiologic findings (no immediate evidence of acute stroke). Jelly Suero for attending Dr. Licea   Spoke to Dr. Barrientos. Radiologist states 40 percent thoracic PTX on right. No evidence of shift. Pt is nontoxic appearing. Dr. Barrientos to see pt and place pigtail catheter. Request thoracic consult as well. Spoke to TAVO Franco from Dr. Perez, trauma and thoracic consult is appreciated. Updated patient and family with results of labs and imaging.

## 2022-02-11 NOTE — ED PROVIDER NOTE - CLINICAL SUMMARY MEDICAL DECISION MAKING FREE TEXT BOX
Anuja [facility]: Came in a code stroke, CTs of head unremarkable, hx of fall, recent ETOH use and cessation high risk for DT vs. ETOH with trauma, CTs of head unremarkable, no tPA, however PTX about 40% s/p pigtail by surgery, ICU admission. Pt and family updated. Patient in the ED developed worsening delirium likely DT vs. metabolic encephalopathy. To admit to ICU.

## 2022-02-11 NOTE — CONSULT NOTE ADULT - SUBJECTIVE AND OBJECTIVE BOX
Neurology Consult requested by:   Patient is a 41y old  Male who presents with a chief complaint of    HPI:  41 year old man wo significant medical hx felled last week with 2 broken ribs, given oxycode for pain, daily drinker, last today. felled again at home last night wo LOC. C/o feeling dizzy, nauseous, trembling.    PAST MEDICAL & SURGICAL HISTORY:  No pertinent past medical history      FAMILY HISTORY:    Social Hx:  Nonsmoker, no drug or alcohol use  Medications and Allergies ReviewedMEDICATIONS  (STANDING):  acetaminophen     Tablet .. 975 milliGRAM(s) Oral once  potassium chloride    Tablet ER 40 milliEquivalent(s) Oral once  sodium chloride 0.9% Bolus 1000 milliLiter(s) IV Bolus once     ROS: Pertinent positives in HPI, all other ROS were reviewed and are negative.      Examination:   Vital Signs Last 24 Hrs  T(C): 36.8 (11 Feb 2022 14:19), Max: 36.8 (11 Feb 2022 14:19)  T(F): 98.2 (11 Feb 2022 14:19), Max: 98.2 (11 Feb 2022 14:19)  HR: 104 (11 Feb 2022 14:19) (104 - 104)  BP: 136/86 (11 Feb 2022 14:19) (136/86 - 136/86)  BP(mean): 101 (11 Feb 2022 14:19) (101 - 101)  RR: 19 (11 Feb 2022 14:19) (19 - 19)  SpO2: 99% (11 Feb 2022 14:19) (99% - 99%)  General: Cooperative, NAD   NECK: supple, no masses  ENT: Normal hearing   Vascular : no carotid bruits,   Lungs: CTAB  Chest: RRR, no murmurs  Extremities: nontender, no edema  Musculoskeletal: no adventitious movements, no joint stiffness  Skin: no rash    Neurological Examination:  NIHSS:0  MS: AOx3. Appropriately interactive, anxious affect. Speech fluent w/o paraphasic error, repetition, naming is intact   CN: VFFTC, PERLL, EOMI-end gaze nytagmus, V1-3 sensation intact, face symmetric, hearing intact, palate elevates symmetrically, tongue midline, SCM equal bilaterally  Motor: normal bulk and tone, + postural tremor, no rigidity or bradykinesia.  5/5 all over   Sens: Intact to light touch.    Reflexes: 2/4 all over, downgoing toes b/l  Coord:  No dysmetria, NORMA intact   Gait: Cannot test    Labs: Reviewed  Imaging:   Comprehensive Metabolic Panel (02.11.22 @ 15:08)   Sodium, Serum: 139 mmol/L   Potassium, Serum: 3.2 mmol/L   Chloride, Serum: 102 mmol/L   Carbon Dioxide, Serum: 32 mmol/L   Anion Gap, Serum: 5 mmol/L   Blood Urea Nitrogen, Serum: 23 mg/dL   Creatinine, Serum: 0.91 mg/dL   Glucose, Serum: 95 mg/dL   Calcium, Total Serum: 9.1 mg/dL   Protein Total, Serum: 7.2 gm/dL   Albumin, Serum: 3.4 g/dL   Bilirubin Total, Serum: 2.0 mg/dL   Alkaline Phosphatase, Serum: 68 U/L   Aspartate Aminotransferase (AST/SGOT): 165 U/L   Alanine Aminotransferase (ALT/SGPT): 111 U/L   eGFR if Non : 104    Complete Blood Count + Automated Diff (02.11.22 @ 15:08)   WBC Count: 6.50 K/uL   RBC Count: 4.12 M/uL   Hemoglobin: 13.8 g/dL   Hematocrit: 41.1 %   Mean Cell Volume: 99.8 fl   Mean Cell Hemoglobin: 33.5 pg   Mean Cell Hemoglobin Conc: 33.6 gm/dL   Red Cell Distrib Width: 12.7 %   Platelet Count - Automated: 172 K/uL

## 2022-02-11 NOTE — ED ADULT NURSE REASSESSMENT NOTE - NS ED NURSE REASSESS COMMENT FT1
MD Barrientos at bedside for chest tube insertion pt medication prior to procedure, consent signed and in the chart  pt stable at this time will cont to monitor

## 2022-02-11 NOTE — PROVIDER CONTACT NOTE (CHANGE IN STATUS NOTIFICATION) - ACTION/TREATMENT ORDERED:
Pt received via stretcher from ED. tachycardic. CIWA in place Placed on tele,  Pt speech is incoherent  A&Ox0. [place] [facility] resident assessed at bedside, made aware of patient condition and visually assessed him, expressed that i feel uncomfortable with patient on the unit for safety risk and his care should be escalated, chest tube in place, MD aware posey vest in place with bilateral wrist restraints, 2mg of ativan administered on arrival, totaling 8mg, 5 staff members required to get patient settled on the unit, Will continue to monitor.

## 2022-02-11 NOTE — ED PROVIDER NOTE - CONSULTANT FREE TEXT FOR MDM DISCUSSED CASE WITH QUESTION
Dr Barrientos (Trauma surgery) - thank you for your timely response and care of this patient  Dr Perez - thoracic thank you for your care of the patient

## 2022-02-11 NOTE — ED ADULT NURSE NOTE - OBJECTIVE STATEMENT
pt c/o headache and dizziness. reports falling last night, tripped and fell from standing height hitting back of head against tile floor, denies LOC, denies anticoagulant use. pt had a fall a few days ago that caused rib fx,

## 2022-02-11 NOTE — ED STATDOCS - PROGRESS NOTE DETAILS
Jelly Sosa for attending Dr. Cruz: 42 y/o male presents to the ED c/o HA and dizziness s/p head injury last night. Pt notes last night he slipped and fell last night. Pt hit his head on tile floor. No LOC. No vomiting. Pt notes he fractured 2 ribs earlier this week. Pt notes he drinks often and has felt shaky. Last EtOH use 2 days ago. Pt reports hallucinations last night. No other complaints at this time. Will send pt to main ED for further evaluation. Jelly Sosa for attending Dr. Cruz: 42 y/o male presents to the ED c/o HA and dizziness s/p head injury last night. Pt notes last night he slipped and fell. Pt hit his head on tile floor. No LOC. No vomiting. Pt notes he fractured 2 ribs earlier this week. Pt notes he drinks often and has felt shaky. Last EtOH use 2 days ago. Pt reports hallucinations last night. No other complaints at this time. Will send pt to main ED for further evaluation.

## 2022-02-11 NOTE — CONSULT NOTE ADULT - SUBJECTIVE AND OBJECTIVE BOX
REASON FOR CONSULT: Alcohol withdrawal    Chief Complaint    HPI: 42 yo m pmhx ETOH abuse (drinks ~1.5L vodka daily), recent fall resulting in rib fx (~1 week ago) presented from home with complaints of ha/dizziness/hallucinations.  Patient endorses he fell at home last night and hit his head.  Denied loc/nausea/vomiting.  Today patient endorsed the ha progressed and per family so did the hallucinations prompting presentation.  In ED patient found to have 40% right ptx and had chest tube placed.  In ED patient with withdrawal symptoms, given ativan 2mg IVP x2 since presentation.  Initially called by ED team to evaluate from medical stand poin for withdrawal management, patient seen and was confused but calm, started on high risk ativan taper.  Patient not a reliable source, unable to tell me when his last drink was, per ED/[facility]/trauma documentation patient's last drink was 2/9 or today. No etoh level drawn upon presentation.  Stat ETOH level/drug screen just ordered.   Called back by surgical team 2/2 patient with CIWA >18, agitation, climbing out of bed, confused, combative despite receiving additional ativan 4mg ivp and now in soft vest/bilateral wrist restraints.  Patient upgraded to ICU and placed on precedex gtt.    Case discussed with Surgery and the ICU PA.    PAST MEDICAL & SURGICAL HISTORY:  No pertinent past medical history  H/O gastric bypass  S/P IVC filter      Allergies  No Known Allergies      FAMILY HISTORY:  FH: CAD (coronary artery disease) (Father)    Family history of CVA (Father)      Medications:  acetaminophen     Tablet .. 650 milliGRAM(s) Oral every 6 hours PRN  dexMEDEtomidine Infusion 0.5 MICROgram(s)/kG/Hr IV Continuous <Continuous>  HYDROmorphone  Injectable 1 milliGRAM(s) IV Push every 4 hours PRN  LORazepam   Injectable 2 milliGRAM(s) IV Push every 2 hours PRN  LORazepam   Injectable   IV Push   LORazepam   Injectable 2 milliGRAM(s) IV Push every 1 hour PRN  LORazepam   Injectable 4 milliGRAM(s) IV Push every 4 hours  ondansetron Injectable 4 milliGRAM(s) IV Push every 6 hours PRN  oxyCODONE    IR 5 milliGRAM(s) Oral every 4 hours PRN  heparin   Injectable 5000 Unit(s) SubCutaneous every 8 hours  pantoprazole    Tablet 40 milliGRAM(s) Oral before breakfast  folic acid 1 milliGRAM(s) Oral daily  lactated ringers. 1000 milliLiter(s) IV Continuous <Continuous>  magnesium sulfate  IVPB 2 Gram(s) IV Intermittent every 1 hour  multivitamin 1 Tablet(s) Oral daily  potassium chloride  10 mEq/100 mL IVPB 10 milliEquivalent(s) IV Intermittent every 1 hour  thiamine 100 milliGRAM(s) Oral daily        Vital Signs Last 24 Hrs  T(C): 37.1 (11 Feb 2022 22:10), Max: 37.1 (11 Feb 2022 22:10)  T(F): 98.7 (11 Feb 2022 22:10), Max: 98.7 (11 Feb 2022 22:10)  HR: 130 (11 Feb 2022 22:00) (93 - 130)  BP: 163/143 (11 Feb 2022 22:10) (102/80 - 163/143)  BP(mean): 149 (11 Feb 2022 22:10) (89 - 149)  RR: 25 (11 Feb 2022 22:00) (15 - 25)  SpO2: 99% (11 Feb 2022 22:00) (98% - 100%)      LABS:                        13.8   6.50  )-----------( 172      ( 11 Feb 2022 15:08 )             41.1     02-11    139  |  104  |  20  ----------------------------<  97  3.4<L>   |  29  |  0.91    Ca    8.9      11 Feb 2022 22:17  Phos  3.5     02-11  Mg     1.7     02-11    TPro  7.0  /  Alb  3.2<L>  /  TBili  2.0<H>  /  DBili  0.6<H>  /  AST  124<H>  /  ALT  97<H>  /  AlkPhos  64  02-11        PT/INR - ( 11 Feb 2022 15:08 )   PT: 13.2 sec;   INR: 1.15 ratio         PTT - ( 11 Feb 2022 15:08 )  PTT:26.0 sec    CULTURES: sent      Physical Examination:  Physical exam as per bedside MD (direct physical examination could not be performed because the visit was provided via Telemedicine):       RADIOLOGY: CXR- right chest tube in place with good reexpansion.    IMP-  Alcohol withdrawal  multiple rib fracture on the right with Pneumothorax    Plan-  ICU monitor  CIWA protocol  Chest tube care    CRITICAL CARE TIME SPENT: 40    This visit was provided via telemedicine. Patient was located at     [facility]  Provider was [facility], [place] for the visit.

## 2022-02-12 LAB
AMPHET UR-MCNC: NEGATIVE — SIGNIFICANT CHANGE UP
ANION GAP SERPL CALC-SCNC: 3 MMOL/L — LOW (ref 5–17)
ANION GAP SERPL CALC-SCNC: 5 MMOL/L — SIGNIFICANT CHANGE UP (ref 5–17)
BARBITURATES UR SCN-MCNC: NEGATIVE — SIGNIFICANT CHANGE UP
BENZODIAZ UR-MCNC: NEGATIVE — SIGNIFICANT CHANGE UP
BUN SERPL-MCNC: 11 MG/DL — SIGNIFICANT CHANGE UP (ref 7–23)
BUN SERPL-MCNC: 13 MG/DL — SIGNIFICANT CHANGE UP (ref 7–23)
CALCIUM SERPL-MCNC: 8.7 MG/DL — SIGNIFICANT CHANGE UP (ref 8.5–10.1)
CALCIUM SERPL-MCNC: 9.2 MG/DL — SIGNIFICANT CHANGE UP (ref 8.5–10.1)
CHLORIDE SERPL-SCNC: 107 MMOL/L — SIGNIFICANT CHANGE UP (ref 96–108)
CHLORIDE SERPL-SCNC: 108 MMOL/L — SIGNIFICANT CHANGE UP (ref 96–108)
CO2 SERPL-SCNC: 28 MMOL/L — SIGNIFICANT CHANGE UP (ref 22–31)
CO2 SERPL-SCNC: 30 MMOL/L — SIGNIFICANT CHANGE UP (ref 22–31)
COCAINE METAB.OTHER UR-MCNC: NEGATIVE — SIGNIFICANT CHANGE UP
CREAT SERPL-MCNC: 0.61 MG/DL — SIGNIFICANT CHANGE UP (ref 0.5–1.3)
CREAT SERPL-MCNC: 0.65 MG/DL — SIGNIFICANT CHANGE UP (ref 0.5–1.3)
ETHANOL SERPL-MCNC: <10 MG/DL — SIGNIFICANT CHANGE UP (ref 0–10)
GLUCOSE SERPL-MCNC: 107 MG/DL — HIGH (ref 70–99)
GLUCOSE SERPL-MCNC: 94 MG/DL — SIGNIFICANT CHANGE UP (ref 70–99)
HCT VFR BLD CALC: 40.4 % — SIGNIFICANT CHANGE UP (ref 39–50)
HGB BLD-MCNC: 13.5 G/DL — SIGNIFICANT CHANGE UP (ref 13–17)
MCHC RBC-ENTMCNC: 33.4 GM/DL — SIGNIFICANT CHANGE UP (ref 32–36)
MCHC RBC-ENTMCNC: 33.5 PG — SIGNIFICANT CHANGE UP (ref 27–34)
MCV RBC AUTO: 100.2 FL — HIGH (ref 80–100)
METHADONE UR-MCNC: NEGATIVE — SIGNIFICANT CHANGE UP
OPIATES UR-MCNC: NEGATIVE — SIGNIFICANT CHANGE UP
PCP SPEC-MCNC: SIGNIFICANT CHANGE UP
PCP UR-MCNC: NEGATIVE — SIGNIFICANT CHANGE UP
PLATELET # BLD AUTO: 135 K/UL — LOW (ref 150–400)
POTASSIUM SERPL-MCNC: 3.4 MMOL/L — LOW (ref 3.5–5.3)
POTASSIUM SERPL-MCNC: 3.9 MMOL/L — SIGNIFICANT CHANGE UP (ref 3.5–5.3)
POTASSIUM SERPL-SCNC: 3.4 MMOL/L — LOW (ref 3.5–5.3)
POTASSIUM SERPL-SCNC: 3.9 MMOL/L — SIGNIFICANT CHANGE UP (ref 3.5–5.3)
RBC # BLD: 4.03 M/UL — LOW (ref 4.2–5.8)
RBC # FLD: 12.7 % — SIGNIFICANT CHANGE UP (ref 10.3–14.5)
SODIUM SERPL-SCNC: 140 MMOL/L — SIGNIFICANT CHANGE UP (ref 135–145)
SODIUM SERPL-SCNC: 141 MMOL/L — SIGNIFICANT CHANGE UP (ref 135–145)
THC UR QL: NEGATIVE — SIGNIFICANT CHANGE UP
WBC # BLD: 4.41 K/UL — SIGNIFICANT CHANGE UP (ref 3.8–10.5)
WBC # FLD AUTO: 4.41 K/UL — SIGNIFICANT CHANGE UP (ref 3.8–10.5)

## 2022-02-12 PROCEDURE — 71045 X-RAY EXAM CHEST 1 VIEW: CPT | Mod: 26

## 2022-02-12 PROCEDURE — 99231 SBSQ HOSP IP/OBS SF/LOW 25: CPT

## 2022-02-12 PROCEDURE — 93010 ELECTROCARDIOGRAM REPORT: CPT

## 2022-02-12 PROCEDURE — 99291 CRITICAL CARE FIRST HOUR: CPT

## 2022-02-12 RX ORDER — POTASSIUM CHLORIDE 20 MEQ
10 PACKET (EA) ORAL
Refills: 0 | Status: COMPLETED | OUTPATIENT
Start: 2022-02-12 | End: 2022-02-12

## 2022-02-12 RX ADMIN — Medication 50 GRAM(S): at 01:16

## 2022-02-12 RX ADMIN — Medication 2 MILLIGRAM(S): at 15:11

## 2022-02-12 RX ADMIN — Medication 4 MILLIGRAM(S): at 02:00

## 2022-02-12 RX ADMIN — Medication 2 MILLIGRAM(S): at 17:48

## 2022-02-12 RX ADMIN — Medication 2 MILLIGRAM(S): at 13:01

## 2022-02-12 RX ADMIN — Medication 50 GRAM(S): at 00:13

## 2022-02-12 RX ADMIN — DEXMEDETOMIDINE HYDROCHLORIDE IN 0.9% SODIUM CHLORIDE 12.2 MICROGRAM(S)/KG/HR: 4 INJECTION INTRAVENOUS at 01:19

## 2022-02-12 RX ADMIN — Medication 2 MILLIGRAM(S): at 21:00

## 2022-02-12 RX ADMIN — Medication 2 MILLIGRAM(S): at 10:34

## 2022-02-12 RX ADMIN — Medication 2 MILLIGRAM(S): at 23:56

## 2022-02-12 RX ADMIN — DEXMEDETOMIDINE HYDROCHLORIDE IN 0.9% SODIUM CHLORIDE 12.2 MICROGRAM(S)/KG/HR: 4 INJECTION INTRAVENOUS at 10:55

## 2022-02-12 RX ADMIN — HEPARIN SODIUM 5000 UNIT(S): 5000 INJECTION INTRAVENOUS; SUBCUTANEOUS at 15:12

## 2022-02-12 RX ADMIN — Medication 4 MILLIGRAM(S): at 05:59

## 2022-02-12 RX ADMIN — Medication 100 MILLIEQUIVALENT(S): at 11:05

## 2022-02-12 RX ADMIN — Medication 100 MILLIEQUIVALENT(S): at 09:42

## 2022-02-12 RX ADMIN — DEXMEDETOMIDINE HYDROCHLORIDE IN 0.9% SODIUM CHLORIDE 12.2 MICROGRAM(S)/KG/HR: 4 INJECTION INTRAVENOUS at 05:58

## 2022-02-12 RX ADMIN — Medication 100 MILLIEQUIVALENT(S): at 08:47

## 2022-02-12 RX ADMIN — Medication 100 MILLIEQUIVALENT(S): at 00:55

## 2022-02-12 RX ADMIN — SODIUM CHLORIDE 100 MILLILITER(S): 9 INJECTION, SOLUTION INTRAVENOUS at 12:10

## 2022-02-12 RX ADMIN — HEPARIN SODIUM 5000 UNIT(S): 5000 INJECTION INTRAVENOUS; SUBCUTANEOUS at 21:00

## 2022-02-12 RX ADMIN — HEPARIN SODIUM 5000 UNIT(S): 5000 INJECTION INTRAVENOUS; SUBCUTANEOUS at 00:17

## 2022-02-12 RX ADMIN — HEPARIN SODIUM 5000 UNIT(S): 5000 INJECTION INTRAVENOUS; SUBCUTANEOUS at 05:59

## 2022-02-12 NOTE — PROGRESS NOTE ADULT - ASSESSMENT
A/P:  Right traumatic pneumothorax  Right 7-9th rib fractures, displaced  ETOH abuse/withdrawal, intensivist on consult  WA protocol  Thoracic surgery on consult, no intervention  S/P right pigtail chest tube placement in ER  SBIRT  GI/DVT prophylaxis  Pain control  Incentive spirometry  Monitor neurologic exams  F/U labs, radiologic studies

## 2022-02-12 NOTE — PROGRESS NOTE ADULT - ASSESSMENT
A/P: 41 male with DTs and a R traumatic PTX    Plan:  ICU  Continue Ativan 2 mg q3h and q2h PRN  Precedex drip  IVF  Thiamine for chronic etoh use and thiamine deficiency  Chest tybe without air leak  TS following  SQH DVT prophylaxis

## 2022-02-12 NOTE — PROGRESS NOTE ADULT - ASSESSMENT
A:    41M  HD # 2  FULL CODE    Here for:    1. R sided PTX, traumatic  s/p pigtail drainage 2/11, remains  2. Rib fractures  3. Alcohol withdrawal syndrome    P:    CT system flushed, now patent with appropriate tidalling  No AL  Minimal drainage  Pending CXR, rpt CXR post placement showed inflated lung    Continue CT to 20cc suction  f/u CXR    Remainder of mgmt per primary teams (trauma, ICU)    Dispo: Cont care in ICU

## 2022-02-13 LAB
ANION GAP SERPL CALC-SCNC: 6 MMOL/L — SIGNIFICANT CHANGE UP (ref 5–17)
BUN SERPL-MCNC: 13 MG/DL — SIGNIFICANT CHANGE UP (ref 7–23)
CALCIUM SERPL-MCNC: 8.9 MG/DL — SIGNIFICANT CHANGE UP (ref 8.5–10.1)
CHLORIDE SERPL-SCNC: 101 MMOL/L — SIGNIFICANT CHANGE UP (ref 96–108)
CO2 SERPL-SCNC: 28 MMOL/L — SIGNIFICANT CHANGE UP (ref 22–31)
CREAT SERPL-MCNC: 0.67 MG/DL — SIGNIFICANT CHANGE UP (ref 0.5–1.3)
GLUCOSE SERPL-MCNC: 81 MG/DL — SIGNIFICANT CHANGE UP (ref 70–99)
HCT VFR BLD CALC: 43.2 % — SIGNIFICANT CHANGE UP (ref 39–50)
HGB BLD-MCNC: 14.3 G/DL — SIGNIFICANT CHANGE UP (ref 13–17)
MAGNESIUM SERPL-MCNC: 1.9 MG/DL — SIGNIFICANT CHANGE UP (ref 1.6–2.6)
MCHC RBC-ENTMCNC: 33 PG — SIGNIFICANT CHANGE UP (ref 27–34)
MCHC RBC-ENTMCNC: 33.1 GM/DL — SIGNIFICANT CHANGE UP (ref 32–36)
MCV RBC AUTO: 99.8 FL — SIGNIFICANT CHANGE UP (ref 80–100)
PHOSPHATE SERPL-MCNC: 3.1 MG/DL — SIGNIFICANT CHANGE UP (ref 2.5–4.5)
PLATELET # BLD AUTO: 151 K/UL — SIGNIFICANT CHANGE UP (ref 150–400)
POTASSIUM SERPL-MCNC: 4 MMOL/L — SIGNIFICANT CHANGE UP (ref 3.5–5.3)
POTASSIUM SERPL-SCNC: 4 MMOL/L — SIGNIFICANT CHANGE UP (ref 3.5–5.3)
RBC # BLD: 4.33 M/UL — SIGNIFICANT CHANGE UP (ref 4.2–5.8)
RBC # FLD: 12.6 % — SIGNIFICANT CHANGE UP (ref 10.3–14.5)
SODIUM SERPL-SCNC: 135 MMOL/L — SIGNIFICANT CHANGE UP (ref 135–145)
WBC # BLD: 7.68 K/UL — SIGNIFICANT CHANGE UP (ref 3.8–10.5)
WBC # FLD AUTO: 7.68 K/UL — SIGNIFICANT CHANGE UP (ref 3.8–10.5)

## 2022-02-13 PROCEDURE — 71045 X-RAY EXAM CHEST 1 VIEW: CPT | Mod: 26

## 2022-02-13 PROCEDURE — 99233 SBSQ HOSP IP/OBS HIGH 50: CPT

## 2022-02-13 PROCEDURE — 99231 SBSQ HOSP IP/OBS SF/LOW 25: CPT

## 2022-02-13 RX ADMIN — Medication 2 MILLIGRAM(S): at 12:47

## 2022-02-13 RX ADMIN — Medication 2 MILLIGRAM(S): at 03:03

## 2022-02-13 RX ADMIN — Medication 1 TABLET(S): at 10:52

## 2022-02-13 RX ADMIN — OXYCODONE HYDROCHLORIDE 5 MILLIGRAM(S): 5 TABLET ORAL at 18:45

## 2022-02-13 RX ADMIN — PANTOPRAZOLE SODIUM 40 MILLIGRAM(S): 20 TABLET, DELAYED RELEASE ORAL at 06:05

## 2022-02-13 RX ADMIN — HYDROMORPHONE HYDROCHLORIDE 1 MILLIGRAM(S): 2 INJECTION INTRAMUSCULAR; INTRAVENOUS; SUBCUTANEOUS at 20:27

## 2022-02-13 RX ADMIN — Medication 2 MILLIGRAM(S): at 22:50

## 2022-02-13 RX ADMIN — Medication 1 MILLIGRAM(S): at 10:53

## 2022-02-13 RX ADMIN — HYDROMORPHONE HYDROCHLORIDE 1 MILLIGRAM(S): 2 INJECTION INTRAMUSCULAR; INTRAVENOUS; SUBCUTANEOUS at 20:56

## 2022-02-13 RX ADMIN — HEPARIN SODIUM 5000 UNIT(S): 5000 INJECTION INTRAVENOUS; SUBCUTANEOUS at 06:05

## 2022-02-13 RX ADMIN — Medication 2 MILLIGRAM(S): at 23:25

## 2022-02-13 RX ADMIN — HEPARIN SODIUM 5000 UNIT(S): 5000 INJECTION INTRAVENOUS; SUBCUTANEOUS at 22:50

## 2022-02-13 RX ADMIN — Medication 2 MILLIGRAM(S): at 17:18

## 2022-02-13 RX ADMIN — Medication 100 MILLIGRAM(S): at 22:27

## 2022-02-13 RX ADMIN — Medication 2 MILLIGRAM(S): at 06:05

## 2022-02-13 RX ADMIN — Medication 100 MILLIGRAM(S): at 10:52

## 2022-02-13 RX ADMIN — OXYCODONE HYDROCHLORIDE 5 MILLIGRAM(S): 5 TABLET ORAL at 17:52

## 2022-02-13 RX ADMIN — HEPARIN SODIUM 5000 UNIT(S): 5000 INJECTION INTRAVENOUS; SUBCUTANEOUS at 13:32

## 2022-02-13 NOTE — PROGRESS NOTE ADULT - ASSESSMENT
A/P:  Right traumatic pneumothorax  Right 7-9th rib fractures, displaced  ETOH abuse/withdrawal, intensivist on consult  CIWA protocol, improved  Thoracic surgery on consult, no intervention  S/P right pigtail chest tube placement in ER  SBIRT  GI/DVT prophylaxis  Pain control  Incentive spirometry  Monitor neurologic exams, stable/improved  F/U labs, radiologic studies  Cont current care and meds

## 2022-02-13 NOTE — PROGRESS NOTE ADULT - ASSESSMENT
A/P: 41 male with DTs and a R traumatic PTX    Plan:    Continue Ativan 2 mg q6h and q2h PRN  Precedex drip D/C  IVF D/C  Thiamine for chronic etoh use and thiamine deficiency  Chest tybe without air leak.  Now on WS.  TS likely can pull it  SQH DVT prophylaxis     Transfer to a Reg floor  Called into the Hospitalist at 10:13AM

## 2022-02-13 NOTE — CHART NOTE - NSCHARTNOTEFT_GEN_A_CORE
Pt passed WS trial > 6 hours    No AL  NO resp distress  CXR with inflated lung w/o PTX    CT pulled    Post CXR OK    AM CXR ordered for further f/u

## 2022-02-13 NOTE — PROGRESS NOTE ADULT - ASSESSMENT
A:    41M  HD # 3  FULL CODE    Here for:    1. R sided PTX, traumatic  s/p pigtail drainage 2/11, remains  2. Rib fractures  3. Alcohol withdrawal syndrome    P:    CT system patent, no AL  Minimal drainage    Place on WS  Pending CXR    Remainder of mgmt per primary teams (trauma, ICU)    Dispo: Cont care in ICU

## 2022-02-14 ENCOUNTER — TRANSCRIPTION ENCOUNTER (OUTPATIENT)
Age: 42
End: 2022-02-14

## 2022-02-14 LAB
ANION GAP SERPL CALC-SCNC: 8 MMOL/L — SIGNIFICANT CHANGE UP (ref 5–17)
BUN SERPL-MCNC: 17 MG/DL — SIGNIFICANT CHANGE UP (ref 7–23)
CALCIUM SERPL-MCNC: 9.4 MG/DL — SIGNIFICANT CHANGE UP (ref 8.5–10.1)
CHLORIDE SERPL-SCNC: 100 MMOL/L — SIGNIFICANT CHANGE UP (ref 96–108)
CO2 SERPL-SCNC: 28 MMOL/L — SIGNIFICANT CHANGE UP (ref 22–31)
CREAT SERPL-MCNC: 0.93 MG/DL — SIGNIFICANT CHANGE UP (ref 0.5–1.3)
GLUCOSE SERPL-MCNC: 109 MG/DL — HIGH (ref 70–99)
HCT VFR BLD CALC: 47.4 % — SIGNIFICANT CHANGE UP (ref 39–50)
HGB BLD-MCNC: 15.7 G/DL — SIGNIFICANT CHANGE UP (ref 13–17)
MAGNESIUM SERPL-MCNC: 2.1 MG/DL — SIGNIFICANT CHANGE UP (ref 1.6–2.6)
MCHC RBC-ENTMCNC: 33.1 GM/DL — SIGNIFICANT CHANGE UP (ref 32–36)
MCHC RBC-ENTMCNC: 33.8 PG — SIGNIFICANT CHANGE UP (ref 27–34)
MCV RBC AUTO: 101.9 FL — HIGH (ref 80–100)
PHOSPHATE SERPL-MCNC: 3 MG/DL — SIGNIFICANT CHANGE UP (ref 2.5–4.5)
PLATELET # BLD AUTO: 274 K/UL — SIGNIFICANT CHANGE UP (ref 150–400)
POTASSIUM SERPL-MCNC: 3.3 MMOL/L — LOW (ref 3.5–5.3)
POTASSIUM SERPL-SCNC: 3.3 MMOL/L — LOW (ref 3.5–5.3)
RBC # BLD: 4.65 M/UL — SIGNIFICANT CHANGE UP (ref 4.2–5.8)
RBC # FLD: 13.1 % — SIGNIFICANT CHANGE UP (ref 10.3–14.5)
SODIUM SERPL-SCNC: 136 MMOL/L — SIGNIFICANT CHANGE UP (ref 135–145)
WBC # BLD: 15.55 K/UL — HIGH (ref 3.8–10.5)
WBC # FLD AUTO: 15.55 K/UL — HIGH (ref 3.8–10.5)

## 2022-02-14 PROCEDURE — 71045 X-RAY EXAM CHEST 1 VIEW: CPT | Mod: 26

## 2022-02-14 PROCEDURE — 99233 SBSQ HOSP IP/OBS HIGH 50: CPT

## 2022-02-14 PROCEDURE — 71045 X-RAY EXAM CHEST 1 VIEW: CPT | Mod: 26,77

## 2022-02-14 RX ORDER — POLYETHYLENE GLYCOL 3350 17 G/17G
17 POWDER, FOR SOLUTION ORAL ONCE
Refills: 0 | Status: COMPLETED | OUTPATIENT
Start: 2022-02-14 | End: 2022-02-14

## 2022-02-14 RX ORDER — SODIUM CHLORIDE 9 MG/ML
1000 INJECTION INTRAMUSCULAR; INTRAVENOUS; SUBCUTANEOUS
Refills: 0 | Status: COMPLETED | OUTPATIENT
Start: 2022-02-14 | End: 2022-02-14

## 2022-02-14 RX ORDER — POTASSIUM CHLORIDE 20 MEQ
20 PACKET (EA) ORAL
Refills: 0 | Status: COMPLETED | OUTPATIENT
Start: 2022-02-14 | End: 2022-02-14

## 2022-02-14 RX ADMIN — Medication 1 TABLET(S): at 09:08

## 2022-02-14 RX ADMIN — Medication 100 MILLIGRAM(S): at 09:08

## 2022-02-14 RX ADMIN — HYDROMORPHONE HYDROCHLORIDE 1 MILLIGRAM(S): 2 INJECTION INTRAMUSCULAR; INTRAVENOUS; SUBCUTANEOUS at 21:05

## 2022-02-14 RX ADMIN — Medication 1 MILLIGRAM(S): at 23:55

## 2022-02-14 RX ADMIN — Medication 100 MILLIGRAM(S): at 21:10

## 2022-02-14 RX ADMIN — HYDROMORPHONE HYDROCHLORIDE 1 MILLIGRAM(S): 2 INJECTION INTRAMUSCULAR; INTRAVENOUS; SUBCUTANEOUS at 22:08

## 2022-02-14 RX ADMIN — Medication 2 MILLIGRAM(S): at 12:47

## 2022-02-14 RX ADMIN — OXYCODONE HYDROCHLORIDE 5 MILLIGRAM(S): 5 TABLET ORAL at 10:00

## 2022-02-14 RX ADMIN — HEPARIN SODIUM 5000 UNIT(S): 5000 INJECTION INTRAVENOUS; SUBCUTANEOUS at 06:30

## 2022-02-14 RX ADMIN — SODIUM CHLORIDE 150 MILLILITER(S): 9 INJECTION INTRAMUSCULAR; INTRAVENOUS; SUBCUTANEOUS at 22:37

## 2022-02-14 RX ADMIN — Medication 20 MILLIEQUIVALENT(S): at 15:09

## 2022-02-14 RX ADMIN — PANTOPRAZOLE SODIUM 40 MILLIGRAM(S): 20 TABLET, DELAYED RELEASE ORAL at 06:30

## 2022-02-14 RX ADMIN — Medication 1 MILLIGRAM(S): at 09:08

## 2022-02-14 RX ADMIN — POLYETHYLENE GLYCOL 3350 17 GRAM(S): 17 POWDER, FOR SOLUTION ORAL at 20:05

## 2022-02-14 RX ADMIN — Medication 2 MILLIGRAM(S): at 06:30

## 2022-02-14 RX ADMIN — HEPARIN SODIUM 5000 UNIT(S): 5000 INJECTION INTRAVENOUS; SUBCUTANEOUS at 21:07

## 2022-02-14 RX ADMIN — HEPARIN SODIUM 5000 UNIT(S): 5000 INJECTION INTRAVENOUS; SUBCUTANEOUS at 13:26

## 2022-02-14 RX ADMIN — Medication 20 MILLIEQUIVALENT(S): at 17:28

## 2022-02-14 RX ADMIN — OXYCODONE HYDROCHLORIDE 5 MILLIGRAM(S): 5 TABLET ORAL at 09:09

## 2022-02-14 RX ADMIN — Medication 2 MILLIGRAM(S): at 17:28

## 2022-02-14 NOTE — SBIRT NOTE ADULT - NSSBIRTALCACTIVEREFTXDET_GEN_A_CORE
Patient reports he drinks 1.5 pints of vodka/daily, AUDIT- 10 score 19. SW provided psychoeducation on alcohol use. Patient reports he has been using vodka daily for ~10 years, reports longest period sober was 6 months (2 years ago). Denies ever being in substance use tx, reports he "just didn't get the urge". Reports he can "stop at any time", however has notifced he is "dependent" on it and wants to engage in tx. Patient has felt feelings of guilt over use. SW provided support, active listening and emotional support. SW provided information on substance use tx including inpatient and outpatient, patient agreeable to outpatient referral. Patient referred to [facility], intake at 11 AM 2/15/2022, patient gave verbal consent for referral.

## 2022-02-14 NOTE — DISCHARGE NOTE PROVIDER - CARE PROVIDERS DIRECT ADDRESSES
,[email or web address]

## 2022-02-14 NOTE — DISCHARGE NOTE PROVIDER - CARE PROVIDER_API CALL
Lito Barrientos (DO)  Surgery  [address], [address] [place] [address]  Phone: [phone]  Fax: [phone]  Follow Up Time: 1 month   Calista Rios)  Thoracic and Cardiac Surgery  [address] [place] [address]  Phone: [phone]  Fax: [phone]  Follow Up Time: 1 week    Primary MEdical Doctor,   Phone: (   )    -  Fax: (   )    -  Follow Up Time: 1-3 days

## 2022-02-14 NOTE — DISCHARGE NOTE PROVIDER - HOSPITAL COURSE
Right pigtail catheter placed on admission.  Subsequently removed yesterday, follow up CXR showed small residual apical pneumothorax.  Patient saturating well on room air, nonlabored breathing.  right rib pain well controlled. Right pigtail catheter placed on admission.  Subsequently removed, follow up CXR showed small residual apical pneumothorax. Most recent CXR shows resolution of right pneumothorax  Patient saturating well on room air, nonlabored breathing.  right rib pain well controlled.

## 2022-02-14 NOTE — DISCHARGE NOTE PROVIDER - NSDCCPCAREPLAN_GEN_ALL_CORE_FT
PRINCIPAL DISCHARGE DIAGNOSIS  Diagnosis: Pneumothorax  Assessment and Plan of Treatment:       SECONDARY DISCHARGE DIAGNOSES  Diagnosis: Multiple fractures of ribs of right side  Assessment and Plan of Treatment:

## 2022-02-14 NOTE — DISCHARGE NOTE NURSING/CASE MANAGEMENT/SOCIAL WORK - NSDCPEFALRISK_GEN_ALL_CORE
For information on Fall & Injury Prevention, visit: [email or web address] OR  [email or web address] OR  https://www.cdc.gov/steadi/patient.html patient

## 2022-02-14 NOTE — DISCHARGE NOTE PROVIDER - NSDCFUADDINST_GEN_ALL_CORE_FT
May shower normally.  No heavy lifting greater than 20lbs for 4 weeks.  If you begin having shortness or breath, chest pain, difficulty breathing, please return to emergency room.  Please follow up with Dr. Barrientos in 1 month after discharge.   May shower normally.  No heavy lifting greater than 20lbs for 4 weeks.  If you begin having shortness or breath, chest pain, difficulty breathing, please return to emergency room.  Please follow up with as indicated.   May shower normally.  No heavy lifting greater than 20lbs for 4 weeks.  If you begin having shortness or breath, chest pain, difficulty breathing, please return to emergency room.  Please follow up with as indicated.    Please abstain from alcohol consumption  Please follow up with provided alcohol dependence support groups and resources

## 2022-02-14 NOTE — DISCHARGE NOTE PROVIDER - NSDCACTIVITY_GEN_ALL_CORE
Return to Work/School allowed/Showering allowed/No heavy lifting/straining Return to Work/School allowed/Do not drive or operate machinery/Showering allowed/Do not make important decisions/Stairs allowed/Walking - Indoors allowed/No heavy lifting/straining/Walking - Outdoors allowed/Follow Instructions Provided by your Surgical Team

## 2022-02-14 NOTE — DISCHARGE NOTE PROVIDER - PROVIDER TOKENS
PROVIDER:[TOKEN:[[id number]],FOLLOWUP:[1 month]] PROVIDER:[TOKEN:[[id number]],FOLLOWUP:[1 week]],FREE:[LAST:[Primary MEdical Doctor],PHONE:[(   )    -],FAX:[(   )    -],FOLLOWUP:[1-3 days]]

## 2022-02-14 NOTE — DISCHARGE NOTE PROVIDER - NSDCMRMEDTOKEN_GEN_ALL_CORE_FT
naproxen 250 mg oral tablet: 1 tab(s) orally 2 times a day, As Needed  Norco 7.5 mg-325 mg oral tablet: 1 tab(s) orally every 6 hours, As Needed   folic acid 1 mg oral tablet: 1 tab(s) orally once a day  Multiple Vitamins oral tablet: 1 tab(s) orally once a day  oxyCODONE 5 mg oral tablet: 1 tab(s) orally every 6 hours, As Needed -for moderate pain MDD:4 tabs  Protonix 40 mg oral delayed release tablet: 1 tab(s) orally once a day   Salonpas Maximum Strength 4% topical film: Apply topically to affected area every 8 hours, As Needed -for mild pain   thiamine 100 mg oral tablet: 1 tab(s) orally once a day

## 2022-02-14 NOTE — DISCHARGE NOTE NURSING/CASE MANAGEMENT/SOCIAL WORK - PATIENT PORTAL LINK FT
You can access the FollowMyHealth Patient Portal offered by [facility] by registering at the following website: [email or web address]. By joining [employer]’s FollowMyHealth portal, you will also be able to view your health information using other applications (apps) compatible with our system.

## 2022-02-14 NOTE — PROGRESS NOTE ADULT - ASSESSMENT
41M sustaining Right traumatic pneumothorax, Right 7-9th rib fractures, displaced.  Residual PTX s/p removal of pigtail.  Increased leukcytosis to 15 today.     Plan:   CIWA protocol, improved  Thoracic surgery on consult, no intervention  S/P right pigtail chest tube placement in ER, now removed   CXR tonight  SBIRT  GI/DVT prophylaxis  Pain control  Incentive spirometry  Monitor neurologic exams, stable/improved  F/U labs, radiologic studies  Cont current care and meds  Repeat CBC in am    Case discussed with Dr. Guillory   41M sustaining Right traumatic pneumothorax, Right 7-9th rib fractures, displaced.  Residual PTX s/p removal of pigtail.  Increased leukcytosis to 15 today.     Plan:   CIWA protocol, improved  Thoracic surgery on consult, no intervention  S/P right pigtail chest tube placement in ER, now removed   CXR tonight  SBIRT  GI/DVT prophylaxis  Pain control  Incentive spirometry  Monitor neurologic exams, stable/improved  F/U labs, radiologic studies  Cont current care and meds  Repeat CBC in am

## 2022-02-15 LAB
HCT VFR BLD CALC: 41.8 % — SIGNIFICANT CHANGE UP (ref 39–50)
HGB BLD-MCNC: 13.5 G/DL — SIGNIFICANT CHANGE UP (ref 13–17)
MCHC RBC-ENTMCNC: 32.3 GM/DL — SIGNIFICANT CHANGE UP (ref 32–36)
MCHC RBC-ENTMCNC: 33.1 PG — SIGNIFICANT CHANGE UP (ref 27–34)
MCV RBC AUTO: 102.5 FL — HIGH (ref 80–100)
PLATELET # BLD AUTO: 182 K/UL — SIGNIFICANT CHANGE UP (ref 150–400)
RBC # BLD: 4.08 M/UL — LOW (ref 4.2–5.8)
RBC # FLD: 12.8 % — SIGNIFICANT CHANGE UP (ref 10.3–14.5)
WBC # BLD: 8.87 K/UL — SIGNIFICANT CHANGE UP (ref 3.8–10.5)
WBC # FLD AUTO: 8.87 K/UL — SIGNIFICANT CHANGE UP (ref 3.8–10.5)

## 2022-02-15 PROCEDURE — 71045 X-RAY EXAM CHEST 1 VIEW: CPT | Mod: 26

## 2022-02-15 PROCEDURE — 99232 SBSQ HOSP IP/OBS MODERATE 35: CPT

## 2022-02-15 RX ORDER — FOLIC ACID 0.8 MG
1 TABLET ORAL
Qty: 0 | Refills: 0 | DISCHARGE
Start: 2022-02-15

## 2022-02-15 RX ORDER — LIDOCAINE 4 G/100G
1 CREAM TOPICAL
Qty: 90 | Refills: 0
Start: 2022-02-15 | End: 2022-03-16

## 2022-02-15 RX ORDER — OXYCODONE HYDROCHLORIDE 5 MG/1
1 TABLET ORAL
Qty: 28 | Refills: 0
Start: 2022-02-15 | End: 2022-02-21

## 2022-02-15 RX ORDER — THIAMINE MONONITRATE (VIT B1) 100 MG
1 TABLET ORAL
Qty: 0 | Refills: 0 | DISCHARGE
Start: 2022-02-15

## 2022-02-15 RX ORDER — PANTOPRAZOLE SODIUM 20 MG/1
1 TABLET, DELAYED RELEASE ORAL
Qty: 30 | Refills: 0
Start: 2022-02-15 | End: 2022-03-16

## 2022-02-15 RX ADMIN — OXYCODONE HYDROCHLORIDE 5 MILLIGRAM(S): 5 TABLET ORAL at 09:11

## 2022-02-15 RX ADMIN — PANTOPRAZOLE SODIUM 40 MILLIGRAM(S): 20 TABLET, DELAYED RELEASE ORAL at 06:01

## 2022-02-15 RX ADMIN — HEPARIN SODIUM 5000 UNIT(S): 5000 INJECTION INTRAVENOUS; SUBCUTANEOUS at 13:55

## 2022-02-15 RX ADMIN — HYDROMORPHONE HYDROCHLORIDE 1 MILLIGRAM(S): 2 INJECTION INTRAMUSCULAR; INTRAVENOUS; SUBCUTANEOUS at 03:47

## 2022-02-15 RX ADMIN — Medication 100 MILLIGRAM(S): at 09:11

## 2022-02-15 RX ADMIN — Medication 1 MILLIGRAM(S): at 05:36

## 2022-02-15 RX ADMIN — OXYCODONE HYDROCHLORIDE 5 MILLIGRAM(S): 5 TABLET ORAL at 20:06

## 2022-02-15 RX ADMIN — HEPARIN SODIUM 5000 UNIT(S): 5000 INJECTION INTRAVENOUS; SUBCUTANEOUS at 22:00

## 2022-02-15 RX ADMIN — Medication 1 MILLIGRAM(S): at 09:11

## 2022-02-15 RX ADMIN — HYDROMORPHONE HYDROCHLORIDE 1 MILLIGRAM(S): 2 INJECTION INTRAMUSCULAR; INTRAVENOUS; SUBCUTANEOUS at 04:30

## 2022-02-15 RX ADMIN — OXYCODONE HYDROCHLORIDE 5 MILLIGRAM(S): 5 TABLET ORAL at 20:36

## 2022-02-15 RX ADMIN — HEPARIN SODIUM 5000 UNIT(S): 5000 INJECTION INTRAVENOUS; SUBCUTANEOUS at 06:01

## 2022-02-15 RX ADMIN — Medication 1 TABLET(S): at 09:11

## 2022-02-15 RX ADMIN — Medication 100 MILLIGRAM(S): at 20:06

## 2022-02-15 RX ADMIN — OXYCODONE HYDROCHLORIDE 5 MILLIGRAM(S): 5 TABLET ORAL at 10:00

## 2022-02-15 NOTE — PROGRESS NOTE ADULT - ASSESSMENT
A/P:  Right traumatic pneumothorax  Right 7-9th rib fractures, displaced  ETOH abuse/withdrawal, resolving/resolved  CIWA protocol, improved  Thoracic surgery on consult, no intervention  S/P right pigtail chest tube placement in ER, removed 2/13/22  SBIRT  GI/DVT prophylaxis  Pain control  Incentive spirometry  Monitor neurologic exams, stable/improved  F/U labs, radiologic studies  Cont current care and meds

## 2022-02-16 VITALS
RESPIRATION RATE: 16 BRPM | HEART RATE: 88 BPM | TEMPERATURE: 98 F | OXYGEN SATURATION: 99 % | DIASTOLIC BLOOD PRESSURE: 86 MMHG | SYSTOLIC BLOOD PRESSURE: 129 MMHG

## 2022-02-16 LAB
ANION GAP SERPL CALC-SCNC: 5 MMOL/L — SIGNIFICANT CHANGE UP (ref 5–17)
BUN SERPL-MCNC: 12 MG/DL — SIGNIFICANT CHANGE UP (ref 7–23)
CALCIUM SERPL-MCNC: 9.3 MG/DL — SIGNIFICANT CHANGE UP (ref 8.5–10.1)
CHLORIDE SERPL-SCNC: 102 MMOL/L — SIGNIFICANT CHANGE UP (ref 96–108)
CO2 SERPL-SCNC: 29 MMOL/L — SIGNIFICANT CHANGE UP (ref 22–31)
CREAT SERPL-MCNC: 0.73 MG/DL — SIGNIFICANT CHANGE UP (ref 0.5–1.3)
GLUCOSE SERPL-MCNC: 90 MG/DL — SIGNIFICANT CHANGE UP (ref 70–99)
HCT VFR BLD CALC: 42.3 % — SIGNIFICANT CHANGE UP (ref 39–50)
HGB BLD-MCNC: 13.9 G/DL — SIGNIFICANT CHANGE UP (ref 13–17)
MAGNESIUM SERPL-MCNC: 2.2 MG/DL — SIGNIFICANT CHANGE UP (ref 1.6–2.6)
MCHC RBC-ENTMCNC: 32.9 GM/DL — SIGNIFICANT CHANGE UP (ref 32–36)
MCHC RBC-ENTMCNC: 33.1 PG — SIGNIFICANT CHANGE UP (ref 27–34)
MCV RBC AUTO: 100.7 FL — HIGH (ref 80–100)
PHOSPHATE SERPL-MCNC: 3.3 MG/DL — SIGNIFICANT CHANGE UP (ref 2.5–4.5)
PLATELET # BLD AUTO: 221 K/UL — SIGNIFICANT CHANGE UP (ref 150–400)
POTASSIUM SERPL-MCNC: 4.7 MMOL/L — SIGNIFICANT CHANGE UP (ref 3.5–5.3)
POTASSIUM SERPL-SCNC: 4.7 MMOL/L — SIGNIFICANT CHANGE UP (ref 3.5–5.3)
RBC # BLD: 4.2 M/UL — SIGNIFICANT CHANGE UP (ref 4.2–5.8)
RBC # FLD: 12.6 % — SIGNIFICANT CHANGE UP (ref 10.3–14.5)
SODIUM SERPL-SCNC: 136 MMOL/L — SIGNIFICANT CHANGE UP (ref 135–145)
WBC # BLD: 6.57 K/UL — SIGNIFICANT CHANGE UP (ref 3.8–10.5)
WBC # FLD AUTO: 6.57 K/UL — SIGNIFICANT CHANGE UP (ref 3.8–10.5)

## 2022-02-16 PROCEDURE — 71045 X-RAY EXAM CHEST 1 VIEW: CPT | Mod: 26

## 2022-02-16 PROCEDURE — 99232 SBSQ HOSP IP/OBS MODERATE 35: CPT

## 2022-02-16 PROCEDURE — 99238 HOSP IP/OBS DSCHRG MGMT 30/<: CPT

## 2022-02-16 RX ADMIN — Medication 1 MILLIGRAM(S): at 10:21

## 2022-02-16 RX ADMIN — OXYCODONE HYDROCHLORIDE 5 MILLIGRAM(S): 5 TABLET ORAL at 00:44

## 2022-02-16 RX ADMIN — OXYCODONE HYDROCHLORIDE 5 MILLIGRAM(S): 5 TABLET ORAL at 05:07

## 2022-02-16 RX ADMIN — Medication 1 TABLET(S): at 10:21

## 2022-02-16 RX ADMIN — HEPARIN SODIUM 5000 UNIT(S): 5000 INJECTION INTRAVENOUS; SUBCUTANEOUS at 06:09

## 2022-02-16 RX ADMIN — OXYCODONE HYDROCHLORIDE 5 MILLIGRAM(S): 5 TABLET ORAL at 00:14

## 2022-02-16 RX ADMIN — HEPARIN SODIUM 5000 UNIT(S): 5000 INJECTION INTRAVENOUS; SUBCUTANEOUS at 13:40

## 2022-02-16 RX ADMIN — Medication 100 MILLIGRAM(S): at 10:21

## 2022-02-16 RX ADMIN — PANTOPRAZOLE SODIUM 40 MILLIGRAM(S): 20 TABLET, DELAYED RELEASE ORAL at 06:08

## 2022-02-16 RX ADMIN — OXYCODONE HYDROCHLORIDE 5 MILLIGRAM(S): 5 TABLET ORAL at 04:37

## 2022-02-16 NOTE — PROGRESS NOTE ADULT - PROVIDER SPECIALTY LIST ADULT
Hospitalist
Trauma Surgery
Hospitalist
Trauma Surgery
Hospitalist
Thoracic Surgery
Trauma Surgery
Critical Care
Thoracic Surgery
Trauma Surgery
Critical Care

## 2022-02-16 NOTE — PROGRESS NOTE ADULT - ASSESSMENT
A/P:  Right traumatic pneumothorax  Right 7-9th rib fractures, displaced  ETOH abuse/withdrawal, resolving/resolved  CIWA protocol, improved  Thoracic surgery on consult, no intervention  S/P right pigtail chest tube placement in ER, removed 2/13/22, repeat CXR shows no residual pneumothorax  SBIRT  GI/DVT prophylaxis  Pain control  Incentive spirometry  Monitor neurologic exams, stable/improved  F/U labs, radiologic studies  Cont current care and meds  Patient for discharge today    Case discussed with Dr. Guillory

## 2022-02-16 NOTE — PROGRESS NOTE ADULT - SUBJECTIVE AND OBJECTIVE BOX
CC:Patient is a 41y old  Male who presents with a chief complaint of right pneumothorax (13 Feb 2022 10:08)      Subjective:  Pt seen and examined at bedside. Pt is awake, alert, comfortable, cooperative confused at exam, pt in no acute distress. Pt states improved c/o right chest/rib pain with meds. Pt  denied c/o fever, chills, SOB, abd pain, N/V/D, extremity pain or dysfunction, hemoptysis, hematemesis, hematuria, hematochexia, headache, diplopia, vertigo, dizzyness. Pt tolerating diet, (+) void, incentive spirometry encouraged, (+) bowel function    ROS:  otherwise as abovementioned ROS    Vital Signs Last 24 Hrs  T(C): 36.7 (13 Feb 2022 14:00), Max: 37.8 (12 Feb 2022 20:00)  T(F): 98 (13 Feb 2022 14:00), Max: 100.1 (12 Feb 2022 20:00)  HR: 118 (13 Feb 2022 14:00) (72 - 124)  BP: 114/81 (13 Feb 2022 14:00) (99/87 - 169/96)  BP(mean): 92 (13 Feb 2022 14:00) (83 - 127)  RR: 20 (13 Feb 2022 14:00) (14 - 26)  SpO2: 100% (13 Feb 2022 14:00) (96% - 100%)    Labs:                                14.3   7.68  )-----------( 151      ( 13 Feb 2022 04:50 )             43.2     CBC Full  -  ( 13 Feb 2022 04:50 )  WBC Count : 7.68 K/uL  RBC Count : 4.33 M/uL  Hemoglobin : 14.3 g/dL  Hematocrit : 43.2 %  Platelet Count - Automated : 151 K/uL  Mean Cell Volume : 99.8 fl  Mean Cell Hemoglobin : 33.0 pg  Mean Cell Hemoglobin Concentration : 33.1 gm/dL  Auto Neutrophil # : x  Auto Lymphocyte # : x  Auto Monocyte # : x  Auto Eosinophil # : x  Auto Basophil # : x  Auto Neutrophil % : x  Auto Lymphocyte % : x  Auto Monocyte % : x  Auto Eosinophil % : x  Auto Basophil % : x    02-13    135  |  101  |  13  ----------------------------<  81  4.0   |  28  |  0.67    Ca    8.9      13 Feb 2022 04:50  Phos  3.1     02-13  Mg     1.9     02-13    TPro  7.0  /  Alb  3.2<L>  /  TBili  2.0<H>  /  DBili  0.6<H>  /  AST  124<H>  /  ALT  97<H>  /  AlkPhos  64  02-11    LIVER FUNCTIONS - ( 11 Feb 2022 22:17 )  Alb: 3.2 g/dL / Pro: 7.0 gm/dL / ALK PHOS: 64 U/L / ALT: 97 U/L / AST: 124 U/L / GGT: x           PT/INR - ( 11 Feb 2022 15:08 )   PT: 13.2 sec;   INR: 1.15 ratio         PTT - ( 11 Feb 2022 15:08 )  PTT:26.0 sec      Meds:  folic acid 1 milliGRAM(s) Oral daily  heparin   Injectable 5000 Unit(s) SubCutaneous every 8 hours  HYDROmorphone  Injectable 1 milliGRAM(s) IV Push every 4 hours PRN  LORazepam   Injectable 2 milliGRAM(s) IV Push every 2 hours PRN  LORazepam   Injectable 2 milliGRAM(s) IV Push every 6 hours  multivitamin 1 Tablet(s) Oral daily  ondansetron Injectable 4 milliGRAM(s) IV Push every 6 hours PRN  oxyCODONE    IR 5 milliGRAM(s) Oral every 4 hours PRN  pantoprazole    Tablet 40 milliGRAM(s) Oral before breakfast  thiamine 100 milliGRAM(s) Oral daily      Radiology:  < from: Xray Chest 1 View- PORTABLE-Routine (Xray Chest 1 View- PORTABLE-Routine in AM.) (02.13.22 @ 09:52) >  ACC: [id number] EXAM:  XR CHEST PORTABLE ROUTINE 1V                          PROCEDURE DATE:  02/13/2022          INTERPRETATION:  Chest one view    HISTORY: Pneumothorax    COMPARISON STUDY: 2/12/2022    Frontal expiratory view of the chest shows the heart to be normal in   size. Right pigtail catheter remains present.    The lungs are clear and there is no evidence of pneumothorax nor pleural   effusion.    IMPRESSION:  No pneumothorax.        Thank you for the courtesy of this referral.    ---End of Report ---            YESENIA GOMEZ MD; Attending Interventional Radiologist  This document has been electronically signed. Feb 13 2022  1:34PM    < end of copied text >      Physical exam:  GCS of 15  Pt is aaox3  Pt in no acute distress  Airway is patent  Breathing is symmetric and unlabored  Neuro: CN II-XII grossly intact  Psych: normal affect  HEENT: normocephalic, ANAI, EOM wnl, no gross craniofacial bony pathology to exam  Neck: No tracheal deviation, no JVD, no crepitus, no ecchymosis, no hematoma  Chest: No gross left rib or sternal pathology or tenderness to exam, right chest tube to waterseal without leak,  + right 7-9th rib tenderness from known fracture pathology, no crepitus, no ecchymosis, no hematoma  Resp: CTAB  CVS: S1S2(+)  ABD: bowel sounds (+), soft, nontender, non distended, no rebound, no guarding, no rigidity, no pelvic instability to exam  EXT: no calf tenderness or edema to exam b/l, pt has good capillary refill in all digits. Sensoromotor function grossly intact, on VTE prophylaxis  Skin: no adverse skin changes to exam      
CTS Progress Note    HPI:    Pt seen and examined  HD # 3  FULL CODE  PMHx alcoholism  Pt here for diziness  s/p fall 2/10  Went to urgent care, sent to ER  w/u reveals R sided PTX  s/p pigtail drainage    Thoracic involved for chest tube mgmt    2/12 AM: Sleeping in bed, precedex drip in place. Chest tube hooked to suction, <20cc serous drainage in containing. Tube clogged with clotted blood; distal portion flushed with 20cc sterile saline to good effect.   2/13 AM: Awake and alert. CT patent, no AL. Placed on WS.    ROS: Unable to obtain 2/2 mental status     Allergies    No Known Allergies    Intolerances    MEDICATIONS  (STANDING):    dexMEDEtomidine Infusion 0.5 MICROgram(s)/kG/Hr (12.2 mL/Hr) IV Continuous <Continuous>  folic acid 1 milliGRAM(s) Oral daily  heparin   Injectable 5000 Unit(s) SubCutaneous every 8 hours  lactated ringers. 1000 milliLiter(s) (100 mL/Hr) IV Continuous <Continuous>  LORazepam   Injectable 2 milliGRAM(s) IV Push every 3 hours  multivitamin 1 Tablet(s) Oral daily  pantoprazole    Tablet 40 milliGRAM(s) Oral before breakfast  thiamine 100 milliGRAM(s) Oral daily    MEDICATIONS  (PRN):    HYDROmorphone  Injectable 1 milliGRAM(s) IV Push every 4 hours PRN Severe Pain (7 - 10)  LORazepam   Injectable 2 milliGRAM(s) IV Push every 2 hours PRN Agitation  ondansetron Injectable 4 milliGRAM(s) IV Push every 6 hours PRN Nausea  oxyCODONE    IR 5 milliGRAM(s) Oral every 4 hours PRN Moderate Pain (4 - 6)    Drug Dosing Weight  Height (cm):   185.4 (11 Feb 2022 14:12)  Weight (kg): 97.5 (11 Feb 2022 14:12)  BMI (kg/m2): 28.4 (11 Feb 2022 14:12)  BSA (m2): 2.22 (11 Feb 2022 14:12)    PAST MEDICAL & SURGICAL HISTORY:  No pertinent past medical history    H/O gastric bypass    S/P IVC filter        FAMILY HISTORY:  FH: CAD (coronary artery disease) (Father)    Family history of CVA (Father)    ROS: See HPI; otherwise, all systems reviewed and negative.    O:    ICU Vital Signs Last 24 Hrs  T(C): 36.8 (13 Feb 2022 04:00), Max: 37.8 (12 Feb 2022 20:00)  T(F): 98.3 (13 Feb 2022 04:00), Max: 100.1 (12 Feb 2022 20:00)  HR: 89 (13 Feb 2022 06:00) (72 - 101)  BP: 164/103 (13 Feb 2022 06:00) (113/88 - 169/96)  BP(mean): 119 (13 Feb 2022 06:00) (96 - 127)  ABP: --  ABP(mean): --  RR: 22 (13 Feb 2022 06:00) (14 - 26)  SpO2: 100% (13 Feb 2022 06:00) (94% - 100%)          I&O's Detail    12 Feb 2022 07:01  -  13 Feb 2022 07:00  --------------------------------------------------------  IN:    Dexmedetomidine: 155 mL    IV PiggyBack: 300 mL    Lactated Ringers: 2313 mL  Total IN: 2768 mL    OUT:    Chest Tube (mL): 29 mL    Voided (mL): 3125 mL  Total OUT: 3154 mL    Total NET: -386 mL    PE:    Adult M lying in bed  No JVD trachea midline + NC in place  S1S2+  CTA B/L  Abd soft NTND  No leg swelling/edema noted  Awake and alert  Skin pink and well perfused  No LE edema noted     LABS:    CBC Full  -  ( 13 Feb 2022 04:50 )  WBC Count : 7.68 K/uL  RBC Count : 4.33 M/uL  Hemoglobin : 14.3 g/dL  Hematocrit : 43.2 %  Platelet Count - Automated : 151 K/uL  Mean Cell Volume : 99.8 fl  Mean Cell Hemoglobin : 33.0 pg  Mean Cell Hemoglobin Concentration : 33.1 gm/dL  Auto Neutrophil # : x  Auto Lymphocyte # : x  Auto Monocyte # : x  Auto Eosinophil # : x  Auto Basophil # : x  Auto Neutrophil % : x  Auto Lymphocyte % : x  Auto Monocyte % : x  Auto Eosinophil % : x  Auto Basophil % : x    02-13    135  |  101  |  13  ----------------------------<  81  4.0   |  28  |  0.67    Ca    8.9      13 Feb 2022 04:50  Phos  3.1     02-13  Mg     1.9     02-13    TPro  7.0  /  Alb  3.2<L>  /  TBili  2.0<H>  /  DBili  0.6<H>  /  AST  124<H>  /  ALT  97<H>  /  AlkPhos  64  02-11    PT/INR - ( 11 Feb 2022 15:08 )   PT: 13.2 sec;   INR: 1.15 ratio         PTT - ( 11 Feb 2022 15:08 )  PTT:26.0 sec    CAPILLARY BLOOD GLUCOSE      POCT Blood Glucose.: 92 mg/dL (12 Feb 2022 23:59)        LIVER FUNCTIONS - ( 11 Feb 2022 22:17 )  Alb: 3.2 g/dL / Pro: 7.0 gm/dL / ALK PHOS: 64 U/L / ALT: 97 U/L / AST: 124 U/L / GGT: x             
PCP: Dr Marcin Katz    Patient is a 41y old  Male who presents with a chief complaint of right pneumothorax (14 Feb 2022 10:43)        HPI:  Pt s/p mechanical fall at work 2/7/22, was seen at [facility] that evening, found to have right rib fractures. Pt was prescribed narcotics for pain control. Pt stated he felt dizzy and "fell" last night, 2/10/22, with + headstrike no LOC. Pt c/o right sided rib/chest pain, difficulty with taking deep breaths/catching breath secondary to pain.   Pt states chronic etoh consumption, last drink 2/9/22. + right traumatic pneumothorax, fxs of 7-9th right rib fxs,  chest tube was placed, and discontinued yesterday. Pt on [facility] protocol    2/15: seen sitting up in bed, without complaints, comfortable,         Review of system- Rest of the review of system are normal except mentioned in HPI    Vital Signs Last 24 Hrs  T(C): 36.6 (02-15-22 @ 08:42), Max: 37.7 (02-14-22 @ 20:02)  T(F): 97.9 (02-15-22 @ 08:42), Max: 99.9 (02-14-22 @ 20:02)  HR: 101 (02-15-22 @ 08:42) (93 - 112)  BP: 118/64 (02-15-22 @ 08:42) (107/67 - 144/73)  BP(mean): 75 (02-15-22 @ 08:42) (75 - 75)  RR: 18 (02-15-22 @ 08:42) (16 - 18)  SpO2: 98% (02-15-22 @ 08:42) (95% - 100%)      PHYSICAL EXAM:    GENERAL: Comfortable, no acute distress   HEAD:  Normocephalic, atraumatic  EYES: EOMI, PERRLA  HEENT: Moist mucous membranes  NECK: Supple, No JVD  NERVOUS SYSTEM:  Alert & Oriented X3, Motor Strength 5/5 B/L upper and lower extremities  CHEST/LUNG: Clear to auscultation bilaterally  HEART: Regular rate and rhythm  ABDOMEN: Soft, non tender, Nondistended, Bowel sounds present  GENITOURINARY: Voiding, no palpable bladder  EXTREMITIES:   No clubbing, cyanosis, or edema  MUSCULOSKELETAL- No muscle tenderness, no joint tenderness  SKIN-no rash          LABS:                                        13.5   8.87  )-----------( 182      ( 15 Feb 2022 07:52 )             41.8                   RECENT CULTURES:      RADIOLOGY & ADDITIONAL TESTS:    < from: CT Angio Head w/ IV Cont (02.11.22 @ 16:44) >    ACC: [id number] EXAM:  CT ANGIO NECK (W)AW IC                        ACC: [id number] EXAM:  CT ANGIO BRAIN (W)AW IC                          PROCEDURE DATE:  02/11/2022          INTERPRETATION:  CT ANGIO BRAIN WITHOUT AND OR WITH IV CONTRAST, CT ANGIO   NECK WITHOUT AND OR WITH IV CONTRAST    CLINICAL HISTORY: Dizziness, persistent/recurrent, cardiac or vascular   cause suspected  disequilib    CT HEAD TECHNIQUE:  Noncontrast CT.  Axial Acqisition. Sagittal and coronal reformations    COMPARISON:  No prior studies for comparison    FINDINGS:  *  HEMORRHAGE:  No evidence of intracranial hemorrhage.  *  BRAIN PARENCHYMA: Mild atrophy. Minimal periventricular white matter   ischemic changes No abnormal regions of parenchymal attenuation. No mass   or mass effect.  *  VENTRICLES / SHIFT:  No hydrocephalus. No midline shift.  *  EXTRA-AXIAL / BASAL CISTERNS:  No extra-axial mass. Basal cisterns   preserved.  *  CALVARIUM AND EXTRACRANIAL SOFT TISSUES:  No depressed calvarial   fracture.  *  SINUSES, ORBITS, MASTOIDS:  Regions of mucosal thickening/small   retention cysts within the maxillary sinuses.    CTA TECHNIQUE:  CT angiography of the neck and brain was performed during the dynamic   administration of intravenous contrast.  MIP reconstructions were   performed and reviewed. Multiplanar reformations were obtained.  Contrast administered 90 cc Omnipaque 350, contrast discarded 10 cc    CTA FINDINGS:  Suboptimal contrast bolus  NECK  RIGHT CAROTID CIRCULATION:  Evaluation of the rightcarotid circulation demonstrates no hemodynamic   significant narrowing utilizing a distal reference.    LEFT CAROTID CIRCULATION:  Evaluation of the left carotid circulation demonstrates no hemodynamic   significant narrowing utilizing a distal reference.    VERTEBRAL ARTERIES:  Evaluation of the vertebral arteries reveals no evidence of a vertebral   artery occlusion or dissection.    BRAIN  ANTERIOR CIRCULATION:  Distal internal carotid arteries are patent.  Anterior cerebral arteries are patent.  Middle cerebral arteries are patent. There is an early trifurcation of   the right middle cerebral artery    POSTERIOR CIRCULATION:  Distal vertebral arteries are patent. Bilateral posterior inferior   cerebellar arteries are seen  Basilar arteryis patent. Proximal superior cerebellar arteries are patent  Posterior cerebral arteries are patent.    OTHER:  There is a moderate right-sided pneumothorax.  Pneumomediastinum is seen extending into the soft tissues of the neck.  There is a calculuswithin the right submandibular duct measured 1.1 x   0.8 cm    IMPRESSION:  NO EVIDENCE OF AN ACUTE INTRACRANIAL HEMORRHAGE, MIDLINE SHIFT, OR   HYDROCEPHALUS  NO HEMODYNAMIC SIGNIFICANT NARROWING WITHIN THE NECK.  NO PROXIMAL LARGE VESSEL OCCLUSION INTRACRANIALLY.  MODERATE RIGHT-SIDED PNEUMOTHORAX. ASSOCIATED PNEUMOMEDIASTINUM.  CALCULUS WITHIN THE RIGHT SUBMANDIBULAR DUCT      < from: CT Chest w/ IV Cont (02.11.22 @ 16:52) >    ACC: [id number] EXAM:  CT ABDOMEN AND PELVIS IC                        ACC: [id number] EXAM:  CT CHEST IC                          PROCEDURE DATE:  02/11/2022          INTERPRETATION:  CLINICAL INFORMATION: Mid abdominal pain and flank pain.   Status post fall.    COMPARISON: None.    CONTRAST/COMPLICATIONS:  IV Contrast: Omnipaque 350 (accession [id number]), IV contrast documented   in associated exam (accession [id number])  90 cc administered   10 cc   discarded  Oral Contrast: NONE  Complications: None reported at time of study completion    PROCEDURE:  CT of the Chest, Abdomen and Pelvis was performed.  Sagittal and coronal reformats were performed.    FINDINGS:  CHEST:  LUNGS AND LARGE AIRWAYS: Moderate-sized right pneumothorax. Areas of   atelectasis involving the right middle lobe and right lower lobe. Trace   right pleural effusion which demonstrates nonsimple fluid and may   represent a small amount of hemothorax.  VESSELS: Within normal limits.  HEART: Heart size is normal. No pericardial effusion.  MEDIASTINUM AND JASON: Moderate pneumomediastinum extends into the soft   tissues of the visualized portion of the lower neck.  CHEST WALL AND LOWER NECK: Within normal limits.    ABDOMEN AND PELVIS:  LIVER: Diffuse fatty infiltration.  BILE DUCTS: Normal caliber.  GALLBLADDER: Within normal limits.  SPLEEN: Within normal limits.  PANCREAS: Within normal limits.  ADRENALS: Within normal limits.  KIDNEYS/URETERS: Within normal limits.    BLADDER: Within normal limits.  REPRODUCTIVE ORGANS: Prostate within normal limits.    BOWEL: No bowel obstruction. Addis-en-Y gastric bypass.  PERITONEUM: No ascites. Coarse benign calcification left upper quadrant.  VESSELS: There is an IVC filter.  RETROPERITONEUM/LYMPH NODES: No lymphadenopathy.  ABDOMINAL WALL: Within normal limits.  BONES: Displaced right seventh, eighth and ninth rib fractures.    Findings of right pneumothorax and pneumomediastinum were discussed by   Dr. Beltran with Dr. Dr. Licea on 2/11/22 at 5:07 pm.    IMPRESSION:  Moderate size right pneumothorax. Trace amount of mildly complex right   pleural fluid suggesting trace amount of associated hemothorax.    Pneumomediastinum. If there is a clinical concern for esophageal injury,   further evaluation may be performed with chest CT with oral contrast or   esophagram.    Displaced right seventh, eighth and ninth rib fractures.    No acute abnormality in the abdomen or pelvis.      < from: Xray Chest 1 View-PORTABLE IMMEDIATE (Xray Chest 1 View-PORTABLE IMMEDIATE .) (02.13.22 @ 14:24) >    ACC: [id number] EXAM:  XR CHEST PORTABLE IMMED 1V                          PROCEDURE DATE:  02/13/2022          INTERPRETATION:  Chest one view    HISTORY: Chest tube removal    COMPARISON STUDY: Earlier the same day    Frontal expiratory view of thechest shows the heart to be normal in   size. Small pneumothorax is seen along the right upper lobe.    The lungs are otherwise clear and there is no evidence of pleural   effusion.    IMPRESSION:  Small right pneumothorax.        MEDICATIONS  (STANDING):  folic acid 1 milliGRAM(s) Oral daily  heparin   Injectable 5000 Unit(s) SubCutaneous every 8 hours  LORazepam   Injectable 2 milliGRAM(s) IV Push every 6 hours  multivitamin 1 Tablet(s) Oral daily  pantoprazole    Tablet 40 milliGRAM(s) Oral before breakfast  thiamine 100 milliGRAM(s) Oral daily    MEDICATIONS  (PRN):  guaiFENesin Oral Liquid (Sugar-Free) 100 milliGRAM(s) Oral every 6 hours PRN Cough  HYDROmorphone  Injectable 1 milliGRAM(s) IV Push every 4 hours PRN Severe Pain (7 - 10)  LORazepam   Injectable 2 milliGRAM(s) IV Push every 2 hours PRN Agitation  ondansetron Injectable 4 milliGRAM(s) IV Push every 6 hours PRN Nausea  oxyCODONE    IR 5 milliGRAM(s) Oral every 4 hours PRN Moderate Pain (4 - 6)      A/P:  40 yo male with above pmh a/w:    #Right traumatic pneumothorax  #Right 7-9th rib fractures, displaced    CT removed    pain control    serial CXR      # New Leukocytosis    resolved    incentive venkata      #ETOH abuse/withdrawal  not scoring, will d/c CIWA  cont on ativan prn    # DVT prophylaxis    hep sq    mobilize    dispo: likely home 1-2 days per surgery    
CC:Patient is a 41y old  Male who presents with a chief complaint of right pneumothorax (14 Feb 2022 14:15)      Subjective:  Pt seen and examined at bedside with chaperone. Pt is AAOx3, pt in no acute distress. Pt states tolerated right rib pain with meds. Pt denied c/o fever, chills, chest pain, SOB, abd pain, N/V/D, extremity pain or dysfunction, hemoptysis, hematemesis, hematuria, hematochexia, headache, diplopia, vertigo, dizzyness. Pt tolerating diet, (+) void, (+) ambulation, (+) bowel function, incentive spirometry encouraged    ROS:  otherwise as abovementioned ROS    Vital Signs Last 24 Hrs  T(C): 36.6 (15 Feb 2022 08:42), Max: 37.7 (14 Feb 2022 20:02)  T(F): 97.9 (15 Feb 2022 08:42), Max: 99.9 (14 Feb 2022 20:02)  HR: 101 (15 Feb 2022 08:42) (93 - 112)  BP: 118/64 (15 Feb 2022 08:42) (107/67 - 144/73)  BP(mean): 75 (15 Feb 2022 08:42) (75 - 75)  RR: 18 (15 Feb 2022 08:42) (16 - 18)  SpO2: 98% (15 Feb 2022 08:42) (95% - 100%)    Labs:                                13.5   8.87  )-----------( 182      ( 15 Feb 2022 07:52 )             41.8     CBC Full  -  ( 15 Feb 2022 07:52 )  WBC Count : 8.87 K/uL  RBC Count : 4.08 M/uL  Hemoglobin : 13.5 g/dL  Hematocrit : 41.8 %  Platelet Count - Automated : 182 K/uL  Mean Cell Volume : 102.5 fl  Mean Cell Hemoglobin : 33.1 pg  Mean Cell Hemoglobin Concentration : 32.3 gm/dL  Auto Neutrophil # : x  Auto Lymphocyte # : x  Auto Monocyte # : x  Auto Eosinophil # : x  Auto Basophil # : x  Auto Neutrophil % : x  Auto Lymphocyte % : x  Auto Monocyte % : x  Auto Eosinophil % : x  Auto Basophil % : x    02-14    136  |  100  |  17  ----------------------------<  109<H>  3.3<L>   |  28  |  0.93    Ca    9.4      14 Feb 2022 07:40  Phos  3.0     02-14  Mg     2.1     02-14              Meds:  folic acid 1 milliGRAM(s) Oral daily  guaiFENesin Oral Liquid (Sugar-Free) 100 milliGRAM(s) Oral every 6 hours PRN  heparin   Injectable 5000 Unit(s) SubCutaneous every 8 hours  HYDROmorphone  Injectable 1 milliGRAM(s) IV Push every 4 hours PRN  LORazepam     Tablet 1 milliGRAM(s) Oral every 6 hours PRN  LORazepam   Injectable 2 milliGRAM(s) IV Push every 6 hours  LORazepam   Injectable 2 milliGRAM(s) IV Push every 2 hours PRN  multivitamin 1 Tablet(s) Oral daily  ondansetron Injectable 4 milliGRAM(s) IV Push every 6 hours PRN  oxyCODONE    IR 5 milliGRAM(s) Oral every 4 hours PRN  pantoprazole    Tablet 40 milliGRAM(s) Oral before breakfast  thiamine 100 milliGRAM(s) Oral daily      Radiology:  < from: Xray Chest 1 View- PORTABLE-Routine (Xray Chest 1 View- PORTABLE-Routine .) (02.14.22 @ 20:37) >  ACC: [id number] EXAM:  XR CHEST PORTABLE ROUTINE 1V                          PROCEDURE DATE:  02/14/2022          INTERPRETATION:  Chest one view    HISTORY: Follow-up pneumothorax    COMPARISON STUDY: Earlier the same day    Frontal expiratory view of the chest shows the heart to be normal in   size. The lungs show reduction of right apical pneumothorax and there is   no evidence of pneumothorax nor pleural effusion.    IMPRESSION:  Smaller right pneumothorax.        Thank you for the courtesy of this referral.    --- End of Report ---            YESENIA GOMEZ MD; Attending Interventional Radiologist  This document has been electronically signed. Feb 15 2022 10:03AM    < end of copied text >      Physical exam:  GCS of 15  Pt is aaox3  Pt in no acute distress  Airway is patent  Breathing is symmetric and unlabored  Neuro: CN II-XII grossly intact  Psych: normal affect  HEENT: normocephalic, ANAI, EOM wnl, no gross craniofacial bony pathology to exam  Neck: No tracheal deviation, no JVD, no crepitus, no ecchymosis, no hematoma  Chest: No gross left rib or sternal pathology or tenderness to exam, + right 7-9th rib tenderness from known fracture pathology, no crepitus, no ecchymosis, no hematoma  Resp: CTAB  CVS: S1S2(+)  ABD: bowel sounds (+), soft, nontender, non distended, no rebound, no guarding, no rigidity, no pelvic instability to exam  EXT: no calf tenderness or edema to exam b/l, pt has good capillary refill in all digits. Sensoromotor function grossly intact, on VTE prophylaxis  Skin: no adverse skin changes to exam      
Pt seen and examined at bedside with chaperone. Pt is AAOx3, pt in no acute distress. Pt has right traumatic pneumothorax. Pt explained the surgical procedure in both medical terminology and in lay terms that the patient understood, insertion of chest tube. Pt explained in both medical terminology and in lay terms that the patient understood, the benefits and alternatives of surgery including non-operative management. Pt explained at length in both medical terminology and in lay terms that the patient understood, the associated risks of surgery including but not limited to infection, bleeding, nerve/organ injury, post procedure pain, poor wound healing, scar formation both internally and externally, risk of seroma/hematoma/abcess formation, risk of cardiac/cns/respiratory insult or injury, risk of need for further intervention as required, risk of morbidity and mortality-death. Pt understood all of the above. All questions were answered. Pt gave informed consent for procedure.
CC:Patient is a 41y old  Male who presents with a chief complaint of right pneumothorax (12 Feb 2022 11:19)      Subjective:  Pt seen and examined at bedside. Pt sedated on precedex for etoh withdrawal. Per nursing staff, pt is comfortable and cooperative at interval checks when sedation held    ROS:  limited secondary to etoh withdrawal, otherwise as abovementioned ROS    Vital Signs Last 24 Hrs  T(C): 37.1 (12 Feb 2022 11:30), Max: 37.1 (11 Feb 2022 22:10)  T(F): 98.8 (12 Feb 2022 11:30), Max: 98.8 (12 Feb 2022 00:00)  HR: 80 (12 Feb 2022 13:25) (74 - 136)  BP: 142/101 (12 Feb 2022 12:30) (102/80 - 188/119)  BP(mean): 112 (12 Feb 2022 12:30) (89 - 149)  RR: 19 (12 Feb 2022 13:25) (12 - 31)  SpO2: 96% (12 Feb 2022 13:25) (94% - 100%)    Labs:                                13.5   4.41  )-----------( 135      ( 12 Feb 2022 05:22 )             40.4     CBC Full  -  ( 12 Feb 2022 05:22 )  WBC Count : 4.41 K/uL  RBC Count : 4.03 M/uL  Hemoglobin : 13.5 g/dL  Hematocrit : 40.4 %  Platelet Count - Automated : 135 K/uL  Mean Cell Volume : 100.2 fl  Mean Cell Hemoglobin : 33.5 pg  Mean Cell Hemoglobin Concentration : 33.4 gm/dL  Auto Neutrophil # : x  Auto Lymphocyte # : x  Auto Monocyte # : x  Auto Eosinophil # : x  Auto Basophil # : x  Auto Neutrophil % : x  Auto Lymphocyte % : x  Auto Monocyte % : x  Auto Eosinophil % : x  Auto Basophil % : x    02-12    141  |  108  |  11  ----------------------------<  94  3.9   |  30  |  0.65    Ca    9.2      12 Feb 2022 12:57  Phos  3.6     02-12  Mg     2.3     02-12    TPro  7.0  /  Alb  3.2<L>  /  TBili  2.0<H>  /  DBili  0.6<H>  /  AST  124<H>  /  ALT  97<H>  /  AlkPhos  64  02-11    LIVER FUNCTIONS - ( 11 Feb 2022 22:17 )  Alb: 3.2 g/dL / Pro: 7.0 gm/dL / ALK PHOS: 64 U/L / ALT: 97 U/L / AST: 124 U/L / GGT: x           PT/INR - ( 11 Feb 2022 15:08 )   PT: 13.2 sec;   INR: 1.15 ratio         PTT - ( 11 Feb 2022 15:08 )  PTT:26.0 sec      Meds:  dexMEDEtomidine Infusion 0.5 MICROgram(s)/kG/Hr IV Continuous <Continuous>  folic acid 1 milliGRAM(s) Oral daily  heparin   Injectable 5000 Unit(s) SubCutaneous every 8 hours  HYDROmorphone  Injectable 1 milliGRAM(s) IV Push every 4 hours PRN  lactated ringers. 1000 milliLiter(s) IV Continuous <Continuous>  LORazepam   Injectable 2 milliGRAM(s) IV Push every 3 hours  LORazepam   Injectable 2 milliGRAM(s) IV Push every 2 hours PRN  multivitamin 1 Tablet(s) Oral daily  ondansetron Injectable 4 milliGRAM(s) IV Push every 6 hours PRN  oxyCODONE    IR 5 milliGRAM(s) Oral every 4 hours PRN  pantoprazole    Tablet 40 milliGRAM(s) Oral before breakfast  thiamine 100 milliGRAM(s) Oral daily      Radiology:  < from: Xray Chest 1 View-PORTABLE IMMEDIATE (Xray Chest 1 View-PORTABLE IMMEDIATE .) (02.11.22 @ 19:12) >    ACC: [id number] EXAM:  XR CHEST PORTABLE IMMED 1V                          PROCEDURE DATE:  02/11/2022          INTERPRETATION:  Chest one view    HISTORY: Chest tube placement    COMPARISON STUDY: Earlier the same day    Frontal expiratory view of the chest shows the heart to be similar in   size. Right pigtail catheter has been placed.    The lungs show mild right base atelectasis and there is no evidence of   pneumothorax nor pleural effusion.    IMPRESSION:  No pneumothorax.        Thank you for the courtesy of this referral.    --- End of Report ---            YESENIA GOMEZ MD; Attending Interventional Radiologist  This document has been electronically signed. Feb 12 2022 11:13AM    < end of copied text >      Physical exam:  Pt in no acute distress  Airway is patent  Breathing is symmetric and unlabored  Neuro: CN II-XII grossly intact  Psych: etoh withdrawal on sedation  HEENT: normocephalic, ANAI, EOM wnl, no gross craniofacial bony pathology to exam  Neck: No tracheal deviation, no JVD, no crepitus, no ecchymosis, no hematoma  Chest: No gross left rib or sternal pathology or tenderness to exam, right chest tube to suction without leak, no crepitus, no ecchymosis, no hematoma  Resp: CTAB  CVS: S1S2(+)  ABD: bowel sounds (+), soft, nontender, non distended, no rebound, no guarding, no rigidity, no pelvic instability to exam  EXT: no calf tenderness or edema to exam b/l, pt has good capillary refill in all digits. Pt on VTE prophylaxis  Skin: no adverse skin changes to exam      
PCP: Dr Marcin Katz    Patient is a 41y old  Male who presents with a chief complaint of right pneumothorax (14 Feb 2022 10:43)        HPI:  Pt s/p mechanical fall at work 2/7/22, was seen at [facility] that evening, found to have right rib fractures. Pt was prescribed narcotics for pain control. Pt stated he felt dizzy and "fell" last night, 2/10/22, with + headstrike no LOC. Pt c/o right sided rib/chest pain, difficulty with taking deep breaths/catching breath secondary to pain.   Pt states chronic etoh consumption, last drink 2/9/22. + right traumatic pneumothorax, fxs of 7-9th right rib fxs,  chest tube was placed, and discontinued yesterday. Pt on [facility] protocol    2/16: seen sitting up in bed, without complaints, comfortable, going home today        Review of system- Rest of the review of system are normal except mentioned in HPI    Vital Signs Last 24 Hrs  T(C): 36.4 (02-16-22 @ 08:45), Max: 36.8 (02-15-22 @ 16:30)  T(F): 97.6 (02-16-22 @ 08:45), Max: 98.3 (02-15-22 @ 19:41)  HR: 88 (02-16-22 @ 08:45) (72 - 98)  BP: 129/86 (02-16-22 @ 08:45) (103/81 - 148/82)  BP(mean): 96 (02-16-22 @ 08:45) (96 - 96)  RR: 16 (02-16-22 @ 08:45) (16 - 18)  SpO2: 99% (02-16-22 @ 08:45) (97% - 100%)      PHYSICAL EXAM:    GENERAL: Comfortable, no acute distress   HEAD:  Normocephalic, atraumatic  EYES: EOMI, PERRLA  HEENT: Moist mucous membranes  NECK: Supple, No JVD  NERVOUS SYSTEM:  Alert & Oriented X3, Motor Strength 5/5 B/L upper and lower extremities  CHEST/LUNG: Clear to auscultation bilaterally  HEART: Regular rate and rhythm  ABDOMEN: Soft, non tender, Nondistended, Bowel sounds present  GENITOURINARY: Voiding, no palpable bladder  EXTREMITIES:   No clubbing, cyanosis, or edema  MUSCULOSKELETAL- No muscle tenderness, no joint tenderness  SKIN-no rash          LABS:                                                           13.9   6.57  )-----------( 221      ( 16 Feb 2022 07:27 )             42.3       02-16    136  |  102  |  12  ----------------------------<  90  4.7   |  29  |  0.73    Ca    9.3      16 Feb 2022 07:27  Phos  3.3     02-16  Mg     2.2     02-16                    RECENT CULTURES:      RADIOLOGY & ADDITIONAL TESTS:        < from: Xray Chest 1 View- PORTABLE-Routine (Xray Chest 1 View- PORTABLE-Routine .) (02.15.22 @ 11:30) >    ACC: [id number] EXAM:  XR CHEST PORTABLE ROUTINE 1V                          PROCEDURE DATE:  02/15/2022          INTERPRETATION:  Clinical history: 41-year-old male, right pneumothorax.    Upright portable view of the chest is compared to 2/14/2022 and   demonstrates a normal cardiac silhouette and normal pulmonary vasculature   with no consolidation, effusion, pneumothorax or acute osseous finding.    IMPRESSION:  No acute radiographic findings, resolution of right pneumothorax    < end of copied text >      < from: CT Angio Head w/ IV Cont (02.11.22 @ 16:44) >    ACC: [id number] EXAM:  CT ANGIO NECK (W)[facility]                        ACC: [id number] EXAM:  CT ANGIO BRAIN (W)[facility]                          PROCEDURE DATE:  02/11/2022          INTERPRETATION:  CT ANGIO BRAIN WITHOUT AND OR WITH IV CONTRAST, CT ANGIO   NECK WITHOUT AND OR WITH IV CONTRAST    CLINICAL HISTORY: Dizziness, persistent/recurrent, cardiac or vascular   cause suspected  disequilib    CT HEAD TECHNIQUE:  Noncontrast CT.  Axial Acqisition. Sagittal and coronal reformations    COMPARISON:  No prior studies for comparison    FINDINGS:  *  HEMORRHAGE:  No evidence of intracranial hemorrhage.  *  BRAIN PARENCHYMA: Mild atrophy. Minimal periventricular white matter   ischemic changes No abnormal regions of parenchymal attenuation. No mass   or mass effect.  *  VENTRICLES / SHIFT:  No hydrocephalus. No midline shift.  *  EXTRA-AXIAL / BASAL CISTERNS:  No extra-axial mass. Basal cisterns   preserved.  *  CALVARIUM AND EXTRACRANIAL SOFT TISSUES:  No depressed calvarial   fracture.  *  SINUSES, ORBITS, MASTOIDS:  Regions of mucosal thickening/small   retention cysts within the maxillary sinuses.    CTA TECHNIQUE:  CT angiography of the neck and brain was performed during the dynamic   administration of intravenous contrast.  MIP reconstructions were   performed and reviewed. Multiplanar reformations were obtained.  Contrast administered 90 cc Omnipaque 350, contrast discarded 10 cc    CTA FINDINGS:  Suboptimal contrast bolus  NECK  RIGHT CAROTID CIRCULATION:  Evaluation of the rightcarotid circulation demonstrates no hemodynamic   significant narrowing utilizing a distal reference.    LEFT CAROTID CIRCULATION:  Evaluation of the left carotid circulation demonstrates no hemodynamic   significant narrowing utilizing a distal reference.    VERTEBRAL ARTERIES:  Evaluation of the vertebral arteries reveals no evidence of a vertebral   artery occlusion or dissection.    BRAIN  ANTERIOR CIRCULATION:  Distal internal carotid arteries are patent.  Anterior cerebral arteries are patent.  Middle cerebral arteries are patent. There is an early trifurcation of   the right middle cerebral artery    POSTERIOR CIRCULATION:  Distal vertebral arteries are patent. Bilateral posterior inferior   cerebellar arteries are seen  Basilar arteryis patent. Proximal superior cerebellar arteries are patent  Posterior cerebral arteries are patent.    OTHER:  There is a moderate right-sided pneumothorax.  Pneumomediastinum is seen extending into the soft tissues of the neck.  There is a calculuswithin the right submandibular duct measured 1.1 x   0.8 cm    IMPRESSION:  NO EVIDENCE OF AN ACUTE INTRACRANIAL HEMORRHAGE, MIDLINE SHIFT, OR   HYDROCEPHALUS  NO HEMODYNAMIC SIGNIFICANT NARROWING WITHIN THE NECK.  NO PROXIMAL LARGE VESSEL OCCLUSION INTRACRANIALLY.  MODERATE RIGHT-SIDED PNEUMOTHORAX. ASSOCIATED PNEUMOMEDIASTINUM.  CALCULUS WITHIN THE RIGHT SUBMANDIBULAR DUCT      < from: CT Chest w/ IV Cont (02.11.22 @ 16:52) >    ACC: [id number] EXAM:  CT ABDOMEN AND PELVIS IC                        ACC: [id number] EXAM:  CT CHEST IC                          PROCEDURE DATE:  02/11/2022          INTERPRETATION:  CLINICAL INFORMATION: Mid abdominal pain and flank pain.   Status post fall.    COMPARISON: None.    CONTRAST/COMPLICATIONS:  IV Contrast: Omnipaque 350 (accession [id number]), IV contrast documented   in associated exam (accession [id number])  90 cc administered   10 cc   discarded  Oral Contrast: NONE  Complications: None reported at time of study completion    PROCEDURE:  CT of the Chest, Abdomen and Pelvis was performed.  Sagittal and coronal reformats were performed.    FINDINGS:  CHEST:  LUNGS AND LARGE AIRWAYS: Moderate-sized right pneumothorax. Areas of   atelectasis involving the right middle lobe and right lower lobe. Trace   right pleural effusion which demonstrates nonsimple fluid and may   represent a small amount of hemothorax.  VESSELS: Within normal limits.  HEART: Heart size is normal. No pericardial effusion.  MEDIASTINUM AND JASON: Moderate pneumomediastinum extends into the soft   tissues of the visualized portion of the lower neck.  CHEST WALL AND LOWER NECK: Within normal limits.    ABDOMEN AND PELVIS:  LIVER: Diffuse fatty infiltration.  BILE DUCTS: Normal caliber.  GALLBLADDER: Within normal limits.  SPLEEN: Within normal limits.  PANCREAS: Within normal limits.  ADRENALS: Within normal limits.  KIDNEYS/URETERS: Within normal limits.    BLADDER: Within normal limits.  REPRODUCTIVE ORGANS: Prostate within normal limits.    BOWEL: No bowel obstruction. Addis-en-Y gastric bypass.  PERITONEUM: No ascites. Coarse benign calcification left upper quadrant.  VESSELS: There is an IVC filter.  RETROPERITONEUM/LYMPH NODES: No lymphadenopathy.  ABDOMINAL WALL: Within normal limits.  BONES: Displaced right seventh, eighth and ninth rib fractures.    Findings of right pneumothorax and pneumomediastinum were discussed by   Dr. Beltran with Dr. Dr. Licea on 2/11/22 at 5:07 pm.    IMPRESSION:  Moderate size right pneumothorax. Trace amount of mildly complex right   pleural fluid suggesting trace amount of associated hemothorax.    Pneumomediastinum. If there is a clinical concern for esophageal injury,   further evaluation may be performed with chest CT with oral contrast or   esophagram.    Displaced right seventh, eighth and ninth rib fractures.    No acute abnormality in the abdomen or pelvis.      < from: Xray Chest 1 View-PORTABLE IMMEDIATE (Xray Chest 1 View-PORTABLE IMMEDIATE .) (02.13.22 @ 14:24) >    ACC: [id number] EXAM:  XR CHEST PORTABLE IMMED 1V                          PROCEDURE DATE:  02/13/2022          INTERPRETATION:  Chest one view    HISTORY: Chest tube removal    COMPARISON STUDY: Earlier the same day    Frontal expiratory view of thechest shows the heart to be normal in   size. Small pneumothorax is seen along the right upper lobe.    The lungs are otherwise clear and there is no evidence of pleural   effusion.    IMPRESSION:  Small right pneumothorax.    MEDICATIONS  (STANDING):  folic acid 1 milliGRAM(s) Oral daily  heparin   Injectable 5000 Unit(s) SubCutaneous every 8 hours  multivitamin 1 Tablet(s) Oral daily  pantoprazole    Tablet 40 milliGRAM(s) Oral before breakfast  thiamine 100 milliGRAM(s) Oral daily    MEDICATIONS  (PRN):  guaiFENesin Oral Liquid (Sugar-Free) 100 milliGRAM(s) Oral every 6 hours PRN Cough  HYDROmorphone  Injectable 1 milliGRAM(s) IV Push every 4 hours PRN Severe Pain (7 - 10)  LORazepam     Tablet 1 milliGRAM(s) Oral every 6 hours PRN Agitation  ondansetron Injectable 4 milliGRAM(s) IV Push every 6 hours PRN Nausea  oxyCODONE    IR 5 milliGRAM(s) Oral every 4 hours PRN Moderate Pain (4 - 6)      A/P:  42 yo male with above pmh a/w:    #Right traumatic pneumothorax  #Right 7-9th rib fractures, displaced    CT removed    pain control    serial CXR: complete resolution of pneumothorax      # New Leukocytosis    resolved    incentive venkata      #ETOH abuse/withdrawal  not scoring, will d/c CIWA  cont on ativan prn    # DVT prophylaxis    hep sq    mobilize    dispo: home today  
PCP: Dr Marcin Katz    Patient is a 41y old  Male who presents with a chief complaint of right pneumothorax (14 Feb 2022 10:43)        HPI:  Pt s/p mechanical fall at work 2/7/22, was seen at [facility] that evening, found to have right rib fractures. Pt was prescribed narcotics for pain control. Pt stated he felt dizzy and "fell" last night, 2/10/22, with + headstrike no LOC. Pt c/o right sided rib/chest pain, difficulty with taking deep breaths/catching breath secondary to pain.   Pt states chronic etoh consumption, last drink 2/9/22. + right traumatic pneumothorax, fxs of 7-9th right rib fxs,  chest tube was placed, and discontinued yesterday. Pt on [facility] protocol    2/14: seen sitting on side of bed, without complaints, comfortable        Review of system- Rest of the review of system are normal except mentioned in HPI    T(C): 37.2 (02-14-22 @ 09:20), Max: 37.8 (02-13-22 @ 20:12)  HR: 118 (02-14-22 @ 09:20) (109 - 128)  BP: 107/58 (02-14-22 @ 09:20) (107/58 - 130/73)  RR: 16 (02-14-22 @ 09:20) (16 - 20)  SpO2: 95% (02-14-22 @ 09:20) (95% - 100%)  Wt(kg): --      PHYSICAL EXAM:    GENERAL: Comfortable, no acute distress   HEAD:  Normocephalic, atraumatic  EYES: EOMI, PERRLA  HEENT: Moist mucous membranes  NECK: Supple, No JVD  NERVOUS SYSTEM:  Alert & Oriented X3, Motor Strength 5/5 B/L upper and lower extremities  CHEST/LUNG: Clear to auscultation bilaterally  HEART: Regular rate and rhythm  ABDOMEN: Soft, non tender, Nondistended, Bowel sounds present  GENITOURINARY: Voiding, no palpable bladder  EXTREMITIES:   No clubbing, cyanosis, or edema  MUSCULOSKELETAL- No muscle tenderness, no joint tenderness  SKIN-no rash          LABS:                        15.7   15.55 )-----------( 274      ( 14 Feb 2022 07:40 )             47.4     02-14    136  |  100  |  17  ----------------------------<  109<H>  3.3<L>   |  28  |  0.93    Ca    9.4      14 Feb 2022 07:40  Phos  3.0     02-14  Mg     2.1     02-14            RECENT CULTURES:      RADIOLOGY & ADDITIONAL TESTS:    < from: CT Angio Head w/ IV Cont (02.11.22 @ 16:44) >    ACC: [id number] EXAM:  CT ANGIO NECK (W)[facility]                        ACC: [id number] EXAM:  CT ANGIO BRAIN (W)[facility]                          PROCEDURE DATE:  02/11/2022          INTERPRETATION:  CT ANGIO BRAIN WITHOUT AND OR WITH IV CONTRAST, CT ANGIO   NECK WITHOUT AND OR WITH IV CONTRAST    CLINICAL HISTORY: Dizziness, persistent/recurrent, cardiac or vascular   cause suspected  disequilib    CT HEAD TECHNIQUE:  Noncontrast CT.  Axial Acqisition. Sagittal and coronal reformations    COMPARISON:  No prior studies for comparison    FINDINGS:  *  HEMORRHAGE:  No evidence of intracranial hemorrhage.  *  BRAIN PARENCHYMA: Mild atrophy. Minimal periventricular white matter   ischemic changes No abnormal regions of parenchymal attenuation. No mass   or mass effect.  *  VENTRICLES / SHIFT:  No hydrocephalus. No midline shift.  *  EXTRA-AXIAL / BASAL CISTERNS:  No extra-axial mass. Basal cisterns   preserved.  *  CALVARIUM AND EXTRACRANIAL SOFT TISSUES:  No depressed calvarial   fracture.  *  SINUSES, ORBITS, MASTOIDS:  Regions of mucosal thickening/small   retention cysts within the maxillary sinuses.    CTA TECHNIQUE:  CT angiography of the neck and brain was performed during the dynamic   administration of intravenous contrast.  MIP reconstructions were   performed and reviewed. Multiplanar reformations were obtained.  Contrast administered 90 cc Omnipaque 350, contrast discarded 10 cc    CTA FINDINGS:  Suboptimal contrast bolus  NECK  RIGHT CAROTID CIRCULATION:  Evaluation of the rightcarotid circulation demonstrates no hemodynamic   significant narrowing utilizing a distal reference.    LEFT CAROTID CIRCULATION:  Evaluation of the left carotid circulation demonstrates no hemodynamic   significant narrowing utilizing a distal reference.    VERTEBRAL ARTERIES:  Evaluation of the vertebral arteries reveals no evidence of a vertebral   artery occlusion or dissection.    BRAIN  ANTERIOR CIRCULATION:  Distal internal carotid arteries are patent.  Anterior cerebral arteries are patent.  Middle cerebral arteries are patent. There is an early trifurcation of   the right middle cerebral artery    POSTERIOR CIRCULATION:  Distal vertebral arteries are patent. Bilateral posterior inferior   cerebellar arteries are seen  Basilar arteryis patent. Proximal superior cerebellar arteries are patent  Posterior cerebral arteries are patent.    OTHER:  There is a moderate right-sided pneumothorax.  Pneumomediastinum is seen extending into the soft tissues of the neck.  There is a calculuswithin the right submandibular duct measured 1.1 x   0.8 cm    IMPRESSION:  NO EVIDENCE OF AN ACUTE INTRACRANIAL HEMORRHAGE, MIDLINE SHIFT, OR   HYDROCEPHALUS  NO HEMODYNAMIC SIGNIFICANT NARROWING WITHIN THE NECK.  NO PROXIMAL LARGE VESSEL OCCLUSION INTRACRANIALLY.  MODERATE RIGHT-SIDED PNEUMOTHORAX. ASSOCIATED PNEUMOMEDIASTINUM.  CALCULUS WITHIN THE RIGHT SUBMANDIBULAR DUCT      < from: CT Chest w/ IV Cont (02.11.22 @ 16:52) >    ACC: [id number] EXAM:  CT ABDOMEN AND PELVIS IC                        ACC: [id number] EXAM:  CT CHEST IC                          PROCEDURE DATE:  02/11/2022          INTERPRETATION:  CLINICAL INFORMATION: Mid abdominal pain and flank pain.   Status post fall.    COMPARISON: None.    CONTRAST/COMPLICATIONS:  IV Contrast: Omnipaque 350 (accession [id number]), IV contrast documented   in associated exam (accession [id number])  90 cc administered   10 cc   discarded  Oral Contrast: NONE  Complications: None reported at time of study completion    PROCEDURE:  CT of the Chest, Abdomen and Pelvis was performed.  Sagittal and coronal reformats were performed.    FINDINGS:  CHEST:  LUNGS AND LARGE AIRWAYS: Moderate-sized right pneumothorax. Areas of   atelectasis involving the right middle lobe and right lower lobe. Trace   right pleural effusion which demonstrates nonsimple fluid and may   represent a small amount of hemothorax.  VESSELS: Within normal limits.  HEART: Heart size is normal. No pericardial effusion.  MEDIASTINUM AND JASON: Moderate pneumomediastinum extends into the soft   tissues of the visualized portion of the lower neck.  CHEST WALL AND LOWER NECK: Within normal limits.    ABDOMEN AND PELVIS:  LIVER: Diffuse fatty infiltration.  BILE DUCTS: Normal caliber.  GALLBLADDER: Within normal limits.  SPLEEN: Within normal limits.  PANCREAS: Within normal limits.  ADRENALS: Within normal limits.  KIDNEYS/URETERS: Within normal limits.    BLADDER: Within normal limits.  REPRODUCTIVE ORGANS: Prostate within normal limits.    BOWEL: No bowel obstruction. Addis-en-Y gastric bypass.  PERITONEUM: No ascites. Coarse benign calcification left upper quadrant.  VESSELS: There is an IVC filter.  RETROPERITONEUM/LYMPH NODES: No lymphadenopathy.  ABDOMINAL WALL: Within normal limits.  BONES: Displaced right seventh, eighth and ninth rib fractures.    Findings of right pneumothorax and pneumomediastinum were discussed by   Dr. Beltran with Dr. Dr. Licea on 2/11/22 at 5:07 pm.    IMPRESSION:  Moderate size right pneumothorax. Trace amount of mildly complex right   pleural fluid suggesting trace amount of associated hemothorax.    Pneumomediastinum. If there is a clinical concern for esophageal injury,   further evaluation may be performed with chest CT with oral contrast or   esophagram.    Displaced right seventh, eighth and ninth rib fractures.    No acute abnormality in the abdomen or pelvis.      < from: Xray Chest 1 View-PORTABLE IMMEDIATE (Xray Chest 1 View-PORTABLE IMMEDIATE .) (02.13.22 @ 14:24) >    ACC: [id number] EXAM:  XR CHEST PORTABLE IMMED 1V                          PROCEDURE DATE:  02/13/2022          INTERPRETATION:  Chest one view    HISTORY: Chest tube removal    COMPARISON STUDY: Earlier the same day    Frontal expiratory view of thechest shows the heart to be normal in   size. Small pneumothorax is seen along the right upper lobe.    The lungs are otherwise clear and there is no evidence of pleural   effusion.    IMPRESSION:  Small right pneumothorax.        MEDICATIONS  (STANDING):  folic acid 1 milliGRAM(s) Oral daily  heparin   Injectable 5000 Unit(s) SubCutaneous every 8 hours  LORazepam   Injectable 2 milliGRAM(s) IV Push every 6 hours  multivitamin 1 Tablet(s) Oral daily  pantoprazole    Tablet 40 milliGRAM(s) Oral before breakfast  thiamine 100 milliGRAM(s) Oral daily    MEDICATIONS  (PRN):  guaiFENesin Oral Liquid (Sugar-Free) 100 milliGRAM(s) Oral every 6 hours PRN Cough  HYDROmorphone  Injectable 1 milliGRAM(s) IV Push every 4 hours PRN Severe Pain (7 - 10)  LORazepam   Injectable 2 milliGRAM(s) IV Push every 2 hours PRN Agitation  ondansetron Injectable 4 milliGRAM(s) IV Push every 6 hours PRN Nausea  oxyCODONE    IR 5 milliGRAM(s) Oral every 4 hours PRN Moderate Pain (4 - 6)      A/P:  42 yo male with above pmh a/w:  #Right traumatic pneumothorax  #Right 7-9th rib fractures, displaced    CT removed    pain control    repeat CXR      # New Leukocytosis    wbc 7.68--->15.55    pt with low grade temps Tmax 100.2    CXR repeated, awaiting official read    still tachycardic      incentive venkata        #ETOH abuse/withdrawal  CIWA protocol, conts on standing ativan  tachycardia r/t withdrawal??    # DVT prophylaxis    hep sq    mobilize    dispo: likely home 1-2 days    
Subjective:  Pt seen and examined at bedside with chaperone. Pt is AAOx3, pt in no acute distress. Pt states tolerated right rib pain with meds. Pt denied c/o fever, chills, chest pain, SOB, abd pain, N/V/D, extremity pain or dysfunction, hemoptysis, hematemesis, hematuria, hematochexia, headache, diplopia, vertigo, dizzyness. Pt tolerating diet, (+) void, (+) ambulation, (+) bowel function, incentive spirometry encouraged    ROS:  otherwise as abovementioned ROS    Vital Signs (24 Hrs):  T(C): 36.4 (02-16-22 @ 08:45), Max: 36.8 (02-15-22 @ 16:30)  HR: 88 (02-16-22 @ 08:45) (72 - 98)  BP: 129/86 (02-16-22 @ 08:45) (103/81 - 148/82)  RR: 16 (02-16-22 @ 08:45) (16 - 18)  SpO2: 99% (02-16-22 @ 08:45) (97% - 100%)  Wt(kg): --  Daily     Daily     I&O's Summary    15 Feb 2022 07:01  -  16 Feb 2022 07:00  --------------------------------------------------------  IN: 1220 mL / OUT: 0 mL / NET: 1220 mL          Physical exam:  GCS of 15  Pt is aaox3  Pt in no acute distress  Airway is patent  Breathing is symmetric and unlabored  Neuro: CN II-XII grossly intact  Psych: normal affect  HEENT: normocephalic, ANAI, EOM wnl, no gross craniofacial bony pathology to exam  Neck: No tracheal deviation, no JVD, no crepitus, no ecchymosis, no hematoma  Chest: No gross left rib or sternal pathology or tenderness to exam, + right 7-9th rib tenderness from known fracture pathology, no crepitus, no ecchymosis, no hematoma  Resp: CTAB  CVS: S1S2(+)  ABD: bowel sounds (+), soft, nontender, non distended, no rebound, no guarding, no rigidity, no pelvic instability to exam  EXT: no calf tenderness or edema to exam b/l, pt has good capillary refill in all digits. Sensoromotor function grossly intact, on VTE prophylaxis  Skin: no adverse skin changes to exam      .  LABS:                         13.9   6.57  )-----------( 221      ( 16 Feb 2022 07:27 )             42.3     02-16    136  |  102  |  12  ----------------------------<  90  4.7   |  29  |  0.73    Ca    9.3      16 Feb 2022 07:27  Phos  3.3     02-16  Mg     2.2     02-16                RADIOLOGY, EKG & ADDITIONAL TESTS: Reviewed.     
Patient seen and examined at beside this am.  Pigtail catheter removed yesterday, post pull CXR showing residual small 5% right apical pneumothorax.  Saturating well on RA. Afebrile. VSS    Vital Signs (24 Hrs):  T(C): 37.2 (02-14-22 @ 09:20), Max: 37.8 (02-13-22 @ 20:12)  HR: 118 (02-14-22 @ 09:20) (109 - 128)  BP: 107/58 (02-14-22 @ 09:20) (107/58 - 130/73)  RR: 16 (02-14-22 @ 09:20) (16 - 18)  SpO2: 95% (02-14-22 @ 09:20) (95% - 99%)  Wt(kg): --  Daily     Daily     I&O's Summary    13 Feb 2022 07:01  -  14 Feb 2022 07:00  --------------------------------------------------------  IN: 962 mL / OUT: 715 mL / NET: 247 mL      Physical exam:  GCS of 15  Pt is aaox3  Pt in no acute distress  Airway is patent  Breathing is symmetric and unlabored  Neuro: CN II-XII grossly intact  Psych: normal affect  HEENT: normocephalic, ANAI, EOM wnl, no gross craniofacial bony pathology to exam  Neck: No tracheal deviation, no JVD, no crepitus, no ecchymosis, no hematoma  Chest: No gross left rib or sternal pathology or tenderness to exam, right chest tube to waterseal without leak,  + right 7-9th rib tenderness from known fracture pathology, no crepitus, no ecchymosis, no hematoma  Resp: CTAB  CVS: S1S2(+)  ABD: bowel sounds (+), soft, nontender, non distended, no rebound, no guarding, no rigidity, no pelvic instability to exam  EXT: no calf tenderness or edema to exam b/l, pt has good capillary refill in all digits. Sensoromotor function grossly intact, on VTE prophylaxis  Skin: no adverse skin changes to exam    .  LABS:                         15.7   15.55 )-----------( 274      ( 14 Feb 2022 07:40 )             47.4     02-14    136  |  100  |  17  ----------------------------<  109<H>  3.3<L>   |  28  |  0.93    Ca    9.4      14 Feb 2022 07:40  Phos  3.0     02-14  Mg     2.1     02-14                RADIOLOGY, EKG & ADDITIONAL TESTS: Reviewed.   < from: Xray Chest 1 View- PORTABLE-Routine (Xray Chest 1 View- PORTABLE-Routine in AM.) (02.14.22 @ 08:09) >      < end of copied text >  < from: Xray Chest 1 View- PORTABLE-Routine (Xray Chest 1 View- PORTABLE-Routine in AM.) (02.14.22 @ 08:09) >  MPRESSION:   Residual RIGHT apical 5% pneumothorax. Continued   surveillance recommended..    < end of copied text >  
Thoracic Surgery Progress Note    HPI:    S:    Pt seen and examined  HD # 2  FULL CODE  PMHx alcoholism  Pt here for diziness  s/p fall 2/10  Went to urgent care, sent to ER  w/u reveals R sided PTX  s/p pigtail drainage    Thoracic involved for chest tube mgmt    2/12 AM: Sleeping in bed, precedex drip in place. Chest tube hooked to suction, <20cc serous drainage in containing. Tube clogged with clotted blood; distal portion flushed with 20cc sterile saline to good effect.     ROS: Unable to obtain 2/2 mental status     Allergies    No Known Allergies    Intolerances    MEDICATIONS  (STANDING):    dexMEDEtomidine Infusion 0.5 MICROgram(s)/kG/Hr (12.2 mL/Hr) IV Continuous <Continuous>  folic acid 1 milliGRAM(s) Oral daily  heparin   Injectable 5000 Unit(s) SubCutaneous every 8 hours  lactated ringers. 1000 milliLiter(s) (100 mL/Hr) IV Continuous <Continuous>  LORazepam   Injectable 2 milliGRAM(s) IV Push every 3 hours  multivitamin 1 Tablet(s) Oral daily  pantoprazole    Tablet 40 milliGRAM(s) Oral before breakfast  potassium chloride  10 mEq/100 mL IVPB 10 milliEquivalent(s) IV Intermittent every 1 hour  thiamine 100 milliGRAM(s) Oral daily    MEDICATIONS  (PRN):    HYDROmorphone  Injectable 1 milliGRAM(s) IV Push every 4 hours PRN Severe Pain (7 - 10)  LORazepam   Injectable 2 milliGRAM(s) IV Push every 2 hours PRN Agitation  ondansetron Injectable 4 milliGRAM(s) IV Push every 6 hours PRN Nausea  oxyCODONE    IR 5 milliGRAM(s) Oral every 4 hours PRN Moderate Pain (4 - 6)    Drug Dosing Weight    Height (cm): 185.4 (11 Feb 2022 14:12)  Weight (kg): 97.5 (11 Feb 2022 14:12)  BMI (kg/m2): 28.4 (11 Feb 2022 14:12)  BSA (m2): 2.22 (11 Feb 2022 14:12)    PAST MEDICAL & SURGICAL HISTORY:  No pertinent past medical history    H/O gastric bypass    S/P IVC filter    FAMILY HISTORY:  FH: CAD (coronary artery disease) (Father)    Family history of CVA (Father)        ROS: See HPI; otherwise, all systems reviewed and negative.    O:    ICU Vital Signs Last 24 Hrs  T(C): 36.8 (12 Feb 2022 06:00), Max: 37.1 (11 Feb 2022 22:10)  T(F): 98.3 (12 Feb 2022 06:00), Max: 98.8 (12 Feb 2022 00:00)  HR: 78 (12 Feb 2022 08:00) (75 - 136)  BP: 160/100 (12 Feb 2022 08:00) (102/80 - 188/119)  BP(mean): 117 (12 Feb 2022 08:00) (89 - 149)  ABP: --  ABP(mean): --  RR: 15 (12 Feb 2022 08:00) (12 - 31)  SpO2: 97% (12 Feb 2022 08:00) (95% - 100%)      I&O's Detail    11 Feb 2022 07:01  -  12 Feb 2022 07:00  --------------------------------------------------------  IN:    Dexmedetomidine: 198 mL    IV PiggyBack: 500 mL    Lactated Ringers: 422 mL  Total IN: 1120 mL    OUT:    Chest Tube (mL): 19 mL  Total OUT: 19 mL    Total NET: 1101 mL      PE:    Adult M lying in bed  No JVD trachea midline + NC in place  S1S2+  CTA B/L  Abd soft NTND  No leg swelling/edema noted  Sedated, arousable  Skin pink and well perfused  No LE edema noted B/L    LABS:    CBC Full  -  ( 12 Feb 2022 05:22 )  WBC Count : 4.41 K/uL  RBC Count : 4.03 M/uL  Hemoglobin : 13.5 g/dL  Hematocrit : 40.4 %  Platelet Count - Automated : 135 K/uL  Mean Cell Volume : 100.2 fl  Mean Cell Hemoglobin : 33.5 pg  Mean Cell Hemoglobin Concentration : 33.4 gm/dL  Auto Neutrophil # : x  Auto Lymphocyte # : x  Auto Monocyte # : x  Auto Eosinophil # : x  Auto Basophil # : x  Auto Neutrophil % : x  Auto Lymphocyte % : x  Auto Monocyte % : x  Auto Eosinophil % : x  Auto Basophil % : x    02-12    140  |  107  |  13  ----------------------------<  107<H>  3.4<L>   |  28  |  0.61    Ca    8.7      12 Feb 2022 05:22  Phos  3.6     02-12  Mg     2.3     02-12    TPro  7.0  /  Alb  3.2<L>  /  TBili  2.0<H>  /  DBili  0.6<H>  /  AST  124<H>  /  ALT  97<H>  /  AlkPhos  64  02-11    PT/INR - ( 11 Feb 2022 15:08 )   PT: 13.2 sec;   INR: 1.15 ratio      PTT - ( 11 Feb 2022 15:08 )  PTT:26.0 sec    CAPILLARY BLOOD GLUCOSE    POCT Blood Glucose.: 117 mg/dL (12 Feb 2022 01:04)    LIVER FUNCTIONS - ( 11 Feb 2022 22:17 )  Alb: 3.2 g/dL / Pro: 7.0 gm/dL / ALK PHOS: 64 U/L / ALT: 97 U/L / AST: 124 U/L / GGT: x               
  CC:    HPI:    Pt is 41 year old male who is s/p mechanical fall at work 2/7/22, was seen at [facility] that evening, found to have right rib fractures. Pt was prescribed narcotics for pain control. Pt stated he felt dizzy and "fell" last night, 2/10/22, with c/o head strike no LOC. Pt c/o right sided rib/chest pain, difficulty with taking deep breaths/catching breath secondary to pain.  Pt states chronic etoh consumption, last drink 2/9/22.    2/12: Patient see in bed in the ICU on a Precedex drip.  Still in DTs       PAST MEDICAL & SURGICAL HISTORY:  No pertinent past medical history    H/O gastric bypass    S/P IVC filter        FAMILY HISTORY:  FH: CAD (coronary artery disease) (Father)    Family history of CVA (Father)        Social Hx:    Allergies    No Known Allergies    Intolerances        Height (cm): 185.4 (02-11 @ 14:12)  Weight (kg): 97.5 (02-11 @ 14:12)  BMI (kg/m2): 28.4 (02-11 @ 14:12)    ICU Vital Signs Last 24 Hrs  T(C): 36.7 (12 Feb 2022 09:00), Max: 37.1 (11 Feb 2022 22:10)  T(F): 98 (12 Feb 2022 09:00), Max: 98.8 (12 Feb 2022 00:00)  HR: 78 (12 Feb 2022 11:10) (75 - 136)  BP: 139/93 (12 Feb 2022 11:00) (102/80 - 188/119)  BP(mean): 106 (12 Feb 2022 11:00) (89 - 149)  ABP: --  ABP(mean): --  RR: 16 (12 Feb 2022 11:10) (12 - 31)  SpO2: 97% (12 Feb 2022 11:10) (94% - 100%)          I&O's Summary    11 Feb 2022 07:01  -  12 Feb 2022 07:00  --------------------------------------------------------  IN: 1120 mL / OUT: 19 mL / NET: 1101 mL                              13.5   4.41  )-----------( 135      ( 12 Feb 2022 05:22 )             40.4       02-12    140  |  107  |  13  ----------------------------<  107<H>  3.4<L>   |  28  |  0.61    Ca    8.7      12 Feb 2022 05:22  Phos  3.6     02-12  Mg     2.3     02-12    TPro  7.0  /  Alb  3.2<L>  /  TBili  2.0<H>  /  DBili  0.6<H>  /  AST  124<H>  /  ALT  97<H>  /  AlkPhos  64  02-11          MEDICATIONS  (STANDING):  dexMEDEtomidine Infusion 0.5 MICROgram(s)/kG/Hr (12.2 mL/Hr) IV Continuous <Continuous>  folic acid 1 milliGRAM(s) Oral daily  heparin   Injectable 5000 Unit(s) SubCutaneous every 8 hours  lactated ringers. 1000 milliLiter(s) (100 mL/Hr) IV Continuous <Continuous>  LORazepam   Injectable 2 milliGRAM(s) IV Push every 3 hours  multivitamin 1 Tablet(s) Oral daily  pantoprazole    Tablet 40 milliGRAM(s) Oral before breakfast  thiamine 100 milliGRAM(s) Oral daily    MEDICATIONS  (PRN):  HYDROmorphone  Injectable 1 milliGRAM(s) IV Push every 4 hours PRN Severe Pain (7 - 10)  LORazepam   Injectable 2 milliGRAM(s) IV Push every 2 hours PRN Agitation  ondansetron Injectable 4 milliGRAM(s) IV Push every 6 hours PRN Nausea  oxyCODONE    IR 5 milliGRAM(s) Oral every 4 hours PRN Moderate Pain (4 - 6)      DVT Prophylaxis: [facility]    Advanced Directives:  Discussed with:    Visit Information: 30 min    ** Time is exclusive of billed procedures and/or teaching and/or routine family updates.        
HPI:    Pt is 41 year old male who is s/p mechanical fall at work 2/7/22, was seen at [facility] that evening, found to have right rib fractures. Pt was prescribed narcotics for pain control. Pt stated he felt dizzy and "fell" last night, 2/10/22, with c/o head strike no LOC. Pt c/o right sided rib/chest pain, difficulty with taking deep breaths/catching breath secondary to pain.  Pt states chronic etoh consumption, last drink 2/9/22.    2/12: Patient see in bed in the ICU on a Precedex drip.  Still in DTs  2/13: MS greatly improved, off Precedex, tolerating PO           PAST MEDICAL & SURGICAL HISTORY:  No pertinent past medical history    H/O gastric bypass    S/P IVC filter        FAMILY HISTORY:  FH: CAD (coronary artery disease) (Father)    Family history of CVA (Father)        Social Hx:    Allergies    No Known Allergies    Intolerances            ICU Vital Signs Last 24 Hrs  T(C): 36.8 (13 Feb 2022 04:00), Max: 37.8 (12 Feb 2022 20:00)  T(F): 98.3 (13 Feb 2022 04:00), Max: 100.1 (12 Feb 2022 20:00)  HR: 113 (13 Feb 2022 09:00) (72 - 113)  BP: 117/88 (13 Feb 2022 09:00) (113/88 - 169/96)  BP(mean): 98 (13 Feb 2022 09:00) (96 - 127)  ABP: --  ABP(mean): --  RR: 23 (13 Feb 2022 09:00) (14 - 26)  SpO2: 98% (13 Feb 2022 09:00) (94% - 100%)          I&O's Summary    12 Feb 2022 07:01  -  13 Feb 2022 07:00  --------------------------------------------------------  IN: 2768 mL / OUT: 3154 mL / NET: -386 mL    13 Feb 2022 07:01  -  13 Feb 2022 10:08  --------------------------------------------------------  IN: 602 mL / OUT: 0 mL / NET: 602 mL                              14.3   7.68  )-----------( 151      ( 13 Feb 2022 04:50 )             43.2       02-13    135  |  101  |  13  ----------------------------<  81  4.0   |  28  |  0.67    Ca    8.9      13 Feb 2022 04:50  Phos  3.1     02-13  Mg     1.9     02-13    TPro  7.0  /  Alb  3.2<L>  /  TBili  2.0<H>  /  DBili  0.6<H>  /  AST  124<H>  /  ALT  97<H>  /  AlkPhos  64  02-11      MEDICATIONS  (STANDING):  folic acid 1 milliGRAM(s) Oral daily  heparin   Injectable 5000 Unit(s) SubCutaneous every 8 hours  LORazepam   Injectable 2 milliGRAM(s) IV Push every 6 hours  multivitamin 1 Tablet(s) Oral daily  pantoprazole    Tablet 40 milliGRAM(s) Oral before breakfast  thiamine 100 milliGRAM(s) Oral daily    MEDICATIONS  (PRN):  HYDROmorphone  Injectable 1 milliGRAM(s) IV Push every 4 hours PRN Severe Pain (7 - 10)  LORazepam   Injectable 2 milliGRAM(s) IV Push every 2 hours PRN Agitation  ondansetron Injectable 4 milliGRAM(s) IV Push every 6 hours PRN Nausea  oxyCODONE    IR 5 milliGRAM(s) Oral every 4 hours PRN Moderate Pain (4 - 6)      DVT Prophylaxis: [facility]    Advanced Directives:  Discussed with:    Visit Information:    ** Time is exclusive of billed procedures and/or teaching and/or routine family updates.

## 2022-02-18 DIAGNOSIS — E51.9 THIAMINE DEFICIENCY, UNSPECIFIED: ICD-10-CM

## 2022-02-18 DIAGNOSIS — S27.0XXA TRAUMATIC PNEUMOTHORAX, INITIAL ENCOUNTER: ICD-10-CM

## 2022-02-18 DIAGNOSIS — S22.41XA MULTIPLE FRACTURES OF RIBS, RIGHT SIDE, INITIAL ENCOUNTER FOR CLOSED FRACTURE: ICD-10-CM

## 2022-02-18 DIAGNOSIS — R00.0 TACHYCARDIA, UNSPECIFIED: ICD-10-CM

## 2022-02-18 DIAGNOSIS — D72.829 ELEVATED WHITE BLOOD CELL COUNT, UNSPECIFIED: ICD-10-CM
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
## 2022-02-18 DIAGNOSIS — R44.3 HALLUCINATIONS, UNSPECIFIED: ICD-10-CM

## 2022-02-18 DIAGNOSIS — F10.10 ALCOHOL ABUSE, UNCOMPLICATED: ICD-10-CM

## 2022-02-18 DIAGNOSIS — F10.239 ALCOHOL DEPENDENCE WITH WITHDRAWAL, UNSPECIFIED: ICD-10-CM

## 2022-03-07 PROBLEM — Z78.9 OTHER SPECIFIED HEALTH STATUS: Chronic | Status: ACTIVE | Noted: 2022-02-24

## 2022-03-14 ENCOUNTER — APPOINTMENT (OUTPATIENT)
Dept: THORACIC SURGERY | Facility: CLINIC | Age: 42
End: 2022-03-14
Payer: COMMERCIAL

## 2022-03-14 VITALS
SYSTOLIC BLOOD PRESSURE: 122 MMHG | WEIGHT: 215 LBS | HEIGHT: 73 IN | BODY MASS INDEX: 28.49 KG/M2 | HEART RATE: 101 BPM | OXYGEN SATURATION: 98 % | DIASTOLIC BLOOD PRESSURE: 77 MMHG

## 2022-03-14 DIAGNOSIS — J93.9 PNEUMOTHORAX, UNSPECIFIED: ICD-10-CM

## 2022-03-14 DIAGNOSIS — Z78.9 OTHER SPECIFIED HEALTH STATUS: ICD-10-CM

## 2022-03-14 PROCEDURE — 99213 OFFICE O/P EST LOW 20 MIN: CPT

## 2022-03-16 PROBLEM — J93.9 PNEUMOTHORAX, RIGHT: Status: ACTIVE | Noted: 2022-03-16

## 2022-03-16 PROBLEM — Z78.9 NON-SMOKER: Status: ACTIVE | Noted: 2022-03-16

## 2022-03-16 NOTE — PHYSICAL EXAM
[Sclera] : the sclera and conjunctiva were normal [PERRL With Normal Accommodation] : pupils were equal in size, round, and reactive to light [Extraocular Movements] : extraocular movements were intact [Neck Appearance] : the appearance of the neck was normal [Neck Cervical Mass (___cm)] : no neck mass was observed [Jugular Venous Distention Increased] : there was no jugular-venous distention [Thyroid Diffuse Enlargement] : the thyroid was not enlarged [Thyroid Nodule] : there were no palpable thyroid nodules [Auscultation Breath Sounds / Voice Sounds] : lungs were clear to auscultation bilaterally [Heart Rate And Rhythm] : heart rate was normal and rhythm regular [Heart Sounds] : normal S1 and S2 [Heart Sounds Gallop] : no gallops [Murmurs] : no murmurs [Heart Sounds Pericardial Friction Rub] : no pericardial rub [Examination Of The Chest] : the chest was normal in appearance [Chest Visual Inspection Thoracic Asymmetry] : no chest asymmetry [Diminished Respiratory Excursion] : normal chest expansion [Bowel Sounds] : normal bowel sounds [Abdomen Soft] : soft [Abdomen Tenderness] : non-tender [Abdomen Mass (___ Cm)] : no abdominal mass palpated [Penis Abnormality] : the penis was normal [Scrotum] : the scrotum was normal [Testes Swelling] : the testicles were not swollen [Testes Mass (___cm)] : there were no testicular masses [Cervical Lymph Nodes Enlarged Posterior Bilaterally] : posterior cervical [Cervical Lymph Nodes Enlarged Anterior Bilaterally] : anterior cervical [Supraclavicular Lymph Nodes Enlarged Bilaterally] : supraclavicular [Axillary Lymph Nodes Enlarged Bilaterally] : axillary [Femoral Lymph Nodes Enlarged Bilaterally] : femoral [Inguinal Lymph Nodes Enlarged Bilaterally] : inguinal [No CVA Tenderness] : no ~M costovertebral angle tenderness [No Spinal Tenderness] : no spinal tenderness [Abnormal Walk] : normal gait [Nail Clubbing] : no clubbing  or cyanosis of the fingernails [Motor Tone] : muscle strength and tone were normal [Musculoskeletal - Swelling] : no joint swelling seen [Skin Color & Pigmentation] : normal skin color and pigmentation [Skin Turgor] : normal skin turgor [] : no rash [Deep Tendon Reflexes (DTR)] : deep tendon reflexes were 2+ and symmetric [Sensation] : the sensory exam was normal to light touch and pinprick [No Focal Deficits] : no focal deficits [Oriented To Time, Place, And Person] : oriented to person, place, and time [Impaired Insight] : insight and judgment were intact [Affect] : the affect was normal

## 2022-03-16 NOTE — HISTORY OF PRESENT ILLNESS
[FreeTextEntry1] : Mr. IBANEZ is a 41 year old male who presents for follow up care. HE is an  that works for [employer]. He was evaluated by Thoracic Surgery while at [facility] for a right pneumothorax and multiple rib fractures. He had pneumothorax resolved by placement of chest tube. He also made mildly displaced right sided rib fractures which did not meet requirement for rib plating. \par \par His chest wall pain is slowly resolving. He denies any shortness of breath. He is on light duty at work. He has no major complaints. \par \par \par

## 2022-03-16 NOTE — ASSESSMENT
[FreeTextEntry1] : Patient seen and examined. CT chest and CXR reviewed with patient. Discussed with the patient what to expect with healing process: 6-8 weeks of healing for fractures, avoid weight weight lifting (more than 20 pounds). If symptoms change or if he experiences any shortness of breath, he was advised to call thoracic surgery office or go to the [place] [facility].

## 2022-08-23 NOTE — ED ADULT NURSE NOTE - CAS TRG GENERAL AIRWAY, MLM
Subjective   Patient ID: Tere is a 47 year old female who presents for her Subsequent Annual Medicare Wellness Visit.    Chief Complaint   Patient presents with   • Medicare Wellness Visit   • Diabetes     Blood sugar in home 91- fasting        Patient Answered Medicare HRA Screening Questions  1.) Do you have an Advance directive, living will, or power of  for health care document that contains your wishes for end of life care? No    2.) Would you like additional information on advance directives? Yes    3.) During the past 4 weeks, how would you rate your health? Fair    4.) During the past 4 weeks, what was the hardest physical activity you could do for at least 2 minutes? Moderate    5.) Do you do moderate to strenuous exercise (brisk walk) for about 20 minutes for 3 or more days per week? No, but I am active with housework or yard work (vacuuming, raking, etc.)    6.) How many servings of the following would you typically eat in a day?  Fruits and Vegetables (1 serving = 1 piece of fruit, 1/2 cup fruits or vegetables) 1 per day  High Fiber / Whole Grain Foods (1 serving = 1 cup cold cereal, 1/2 cup cooked cereal, 1 slice bread) 1 per day  Fried or High Fat Foods (1 serving = 1 Carias, French Fries, chips, doughnut, fried chicken/fish) None  Sugar Sweetened Beverages (1 serving = 1 can or 12 oz cup of soda or juice) None    7.) Have you had a fall two or more times in the past year? No    8.) During the past 4 weeks, has your physical and emotional health limited your social activities with family, friends, neighbors, or other groups? Not at all    9.) Do you feel safe at home? Yes    10.) How often do you have trouble taking medicines the way you have been told to take them? I always take my prescribed medications    11.) Over the past 4 weeks how often have you experienced the following?  Bladder Control problems - (urine leaking)Never  Bowel control problems - Never  Teeth or Denture Problems -  Never  Bodily pain - Sometimes  Tiredness or Fatigue - Never  Feeling stressed or overwhelmed - Never  Anger or frustration - Never  Problems with your hearing - Never  Problems using the telephone - Never  Problems with your balance - Never  Driven/Ridden in a car without wearing your seatbelt - Never  Sexual Problems - Never    12.) Do you need help with any of the following activities (bathing, grooming, feeding, toileting, getting out of bed/chairs)? None of these apply to me    13.) Do you need help with any of the following activities (going to places outside of walking distance, shopping, housework/laundry, preparing meals, handling own money)? None of these apply to me    14.) During the past 4 weeks, was someone available to help if you needed and wanted help? Yes, as much as I wanted    15.) How confident are you that you can control and manage most of your health problems? Very confident    Tere has a past medical history of Anemia, Arthritis, COPD, mild (CMS/HCC) (7/16/2018), Depressive disorder, Diabetes mellitus (CMS/HCC), Essential (primary) hypertension, and Hyperlipoproteinemia.    She has no past medical history of Allergy, Anxiety, Cataract, Chronic kidney disease, GERD (gastroesophageal reflux disease), Glaucoma (increased eye pressure), Meningitis, Osteoporosis, RAD (reactive airway disease), Sickle cell anemia (CMS/HCC), Thyroid disease, or Tuberculosis.    Tere has Benign essential hypertension; COPD, mild (CMS/HCC); Hyperlipidemia; Lymphedema; Obstructive sleep apnea; Type 2 diabetes mellitus with hyperlipidemia (CMS/HCC); Severe obesity (BMI 35.0-35.9 with comorbidity) (CMS/HCC); Shortness of breath; Preop cardiovascular exam; Uterine fibroid; Pain of left hip joint; Chronic left-sided low back pain with sciatica; Palpitations; Sickle cell trait (CMS/HCC); Medicare annual wellness visit, subsequent; Educated about COVID-19 virus infection; Diabetes mellitus with coincident  hypertension (CMS/HCC); Femoroacetabular impingement of left hip; Chest tightness; Pain in both lower extremities; Acute right-sided low back pain without sciatica; Uncontrolled hypertension; Preop exam for internal medicine; Right upper quadrant abdominal pain; Other constipation; Nausea; and Fatty liver on their problem list.    Tere has a past surgical history that includes Appendectomy;  section, low transverse; and Leg Surgery (2009).    Her family history includes Cancer in her father; Congestive Heart Failure in her mother; Diabetes in her father and mother; Heart disease in her father.    Tere reports that she has never smoked. She has never used smokeless tobacco. She reports current alcohol use. She reports that she does not use drugs.    Tere is allergic to benazepril, benazepril hcl, penicillin g, and penicillins.    Tere   Current Outpatient Medications   Medication Sig Dispense Refill   • metFORMIN (GLUCOPHAGE-XR) 500 MG 24 hr tablet TAKE 1 TABLET BY MOUTH TWICE DAILY 180 tablet 0   • docusate sodium (COLACE) 100 MG capsule Take 100 mg by mouth in the morning and 100 mg in the evening.     • polyethylene glycol (MIRALAX) 17 g packet TAKE 17 GRAMS DISSOLVED IN WATER BY MOUTH TWICE DAILY     • chlorthalidone (THALITONE) 25 MG tablet Take 0.5 tablets by mouth daily. 90 tablet 3   • losartan (COZAAR) 100 MG tablet Take 0.5 tablets by mouth daily. 90 tablet 0   • meloxicam (MOBIC) 7.5 MG tablet TAKE 1 TABLET BY MOUTH DAILY AS NEEDED FOR PAIN 30 tablet 0   • methylPREDNISolone (MEDROL DOSEPAK) 4 MG tablet follow package directions 21 tablet 0   • Ventolin  (90 Base) MCG/ACT inhaler 2 puffs every 4 hours as needed.     • Breo Ellipta 100-25 MCG/INH inhaler      • lidocaine (LMX) 4 % cream Apply topically 2 times daily as needed for Pain. 120 g 11   • OneTouch Ultra test strip USE AS DIRECTED TO CHECK BLOOD SUGAR EVERY  strip 3   • DULoxetine (CYMBALTA) 20 MG capsule  TAKE 1 CAPSULE BY MOUTH DAILY 30 capsule 3   • blood glucose meter Use to check blood sugar once daily 1 kit 0   • blood glucose lancets Use to test blood sugar once daily 100 each 3   • oxybutynin (DITROPAN) 5 MG tablet      • cetirizine (ZyrTEC) 10 MG tablet TAKE 1 TABLET BY MOUTH EVERY EVENING FOR ITCHING 30 tablet 6   • ferrous sulfate 325 (65 FE) MG tablet TAKE ONE TABLET BY MOUTH DAILY       No current facility-administered medications for this visit.     Facility-Administered Medications Ordered in Other Visits   Medication   • perflutren lipid microsphere (DEFINITY) injection 2 mL       [id number] - [place], [place] - [address] AT [facility] [address]  [address] [facility] [address]  Phone: [phone] Fax: [phone]      Patient Care Team:  Estee Smart MD as PCP - General (Internal Medicine)    Screenings  ADLs  ADL Before Admission: Independent  ADL Needs Assist: No  ADL Score: 12    Short of Breath or Fatigue with ADL's: No  Recent Decline in ADL's: No    Mobility Assist Devices: None  Are you deaf or do you have serious difficulty  hearing? : No  Are you blind or do you have serious difficulty seeing, even when wearing glasses?: No  Sensory Support Devices: Eyeglasses        Home Safety: Home safety measures discussed    Depression PHQ2/9:           Depression assessment/plan: Depression screening is negative no further plan needed.     Cognitive/Functional Status:  Are you deaf or do you have serious difficulty  hearing? : No  Are you blind or do you have serious difficulty seeing, even when wearing glasses?: No  Are you blind or do you have serious difficulty seeing, even when wearing glasses?: No     Cognitive Assessment: no evidence of cognitive dysfunction by direct observation    STEADI-Fall Risk       Hearing Impairment: Are you deaf or do you have serious difficulty  hearing? : No  Vision/Hearing Screening:    Hearing Screening    125Hz 250Hz 500Hz  1000Hz 2000Hz 3000Hz 4000Hz 6000Hz 8000Hz   Right ear:            Left ear:            Comments: Normal bilaterally     Visual Acuity Screening    Right eye Left eye Both eyes   Without correction: 20/40 20/40    With correction: 20/25 20/25         Advanced care planning: Discussed with patient. Patient understand need and will complete forms.  Forms provided    Needed Screening/Treatment:   Immunizations reviewed and patient needs: Hepatitis B     Needed follow up:  None    HPI  For annual Medicare wellness subsequent visit  Also for management of diabetes, hyperlipidemia, obesity and hypertension.  Has had gastric bypass surgery in May with a significant weight reduction.  Also for management of lymphedema, patient has everything set for garment fitted for lymphedema.  Also has obstructive sleep apnea on CPAP  Review of Systems   Constitutional: Negative for activity change, appetite change, fatigue, fever and unexpected weight change.   HENT: Negative for congestion, ear pain, hearing loss, nosebleeds, postnasal drip, sinus pain, sneezing, sore throat, tinnitus, trouble swallowing and voice change.    Eyes: Negative for pain and visual disturbance.   Respiratory: Negative for cough, shortness of breath and wheezing.    Cardiovascular: Positive for leg swelling. Negative for chest pain and palpitations.   Gastrointestinal: Negative for abdominal pain, blood in stool, constipation, diarrhea, nausea and vomiting.   Endocrine: Negative for cold intolerance, heat intolerance, polydipsia, polyphagia and polyuria.   Genitourinary: Negative for decreased urine volume, difficulty urinating, dysuria, frequency, hematuria and urgency.   Musculoskeletal: Positive for arthralgias. Negative for gait problem and myalgias.   Skin: Negative for color change and rash.   Allergic/Immunologic: Negative for environmental allergies and food allergies.   Neurological: Negative for dizziness, weakness, numbness and headaches.    Hematological: Negative for adenopathy. Does not bruise/bleed easily.   Psychiatric/Behavioral: Negative for behavioral problems, dysphoric mood, hallucinations and sleep disturbance. The patient is not nervous/anxious.        Objective   Vitals: BP (!) 140/88 (BP Location: LUE - Left upper extremity, Patient Position: Sitting, Cuff Size: Regular)   Pulse (!) 47   Temp 98 °F (36.7 °C) (Oral)   Resp 15   Ht 5' 9\" (1.753 m)   Wt 109 kg (240 lb 4.8 oz)   BMI 35.49 kg/m²   BSA 2.23 m²   Body mass index is 35.49 kg/m².  BMI ASSESSMENT/PLAN:  Patient is obese.    Caloric restriction and Low carbohydrate diet        Physical Exam  Vitals:    08/23/22 1016   BP: (!) 140/88   BP Location: LUE - Left upper extremity   Patient Position: Sitting   Cuff Size: Regular   Pulse: (!) 47   Resp: 15   Temp: 98 °F (36.7 °C)   TempSrc: Oral   SpO2: 100%   Weight: 109 kg (240 lb 4.8 oz)   Height: 5' 9\" (1.753 m)   PainSc:  0     Nursing note and vitals reviewed  Constitutional:  oriented to person, place, and time.  appears well-developed and well-nourished.   HENT:   Head: Normocephalic and atraumatic.   Ears: External ears normal. No cerumen impaction, tympanic membrane normal  Nose: Nose normal.  No hypertrophy or edema of turbinates.  No sinus tenderness  Mouth/Throat: Oropharynx is clear and moist.   Eyes: EOM are normal. Pupils are equal, round, and reactive to light.   Neck: Neck supple. No JVD present. No tracheal deviation present. No thyromegaly present.   Lungs: Clear with good air entry, no wheezes or rales.  Cardiovascular: Normal rate, regular rhythm and normal heart sounds. Exam reveals no gallop.   No murmur heard.  Abdomen: Not distended, no tenderness, no mass, bowel sounds normal.  Musculoskeletal: Normal range of motion.  Has bilateral lower extremity lymphedema   Neurological:  oriented to person, place, and time.   Speech normal , gait normal  No sensory or motor deficit noted   Skin: Skin is dry. No rash    Psychiatric:has a normal mood and affect.     Assessment   Problem List Items Addressed This Visit        Low    Lymphedema  Support stockings /lymphedema compression garment ordered earlier awaiting measurement  Compression garment will help patient ambulate better and prevent edema of legs and symptomatically better help to reduce heaviness of the legs    Obstructive sleep apnea    Type 2 diabetes mellitus with hyperlipidemia (CMS/[facility])    Relevant Orders    SERVICE TO OPHTHALMOLOGY    Severe obesity (BMI 35.0-35.9 with comorbidity) (CMS/[facility])  April weight was 307 now 240, has lost 67 pounds  Advised to continue losing weight    Sickle cell trait (CMS/[facility])  Stable, will monitor    Medicare annual wellness visit, subsequent - Primary  Plan:   Advance Care Planning Addressed: Advance care planning and need for advance Directives, and DNR discussed with the patient.  Power of  form given   Total time spent discussing Advance Care Planning 5 minutes.   Health Maintenance (discussed and/or reviewed):   - Care Coordination: Done  - Patient Education: Done.    - Multiple ER/Acute Admissions: Discussed.    - Transportation Needs: Not needed, discussed options.   - Fall Risks Identified: None, all precautions discussed.  - Evaluate for respiratory program: Not applicable.    - Medication needs: Addressed.    - Social Concerns: None    Pain Screening (discussion and follow-up).   - Patient Education done, continue current medication.  Options were discussed, regarding pain management.  Patient Education (taught and/or recommended):.   - Patient educated on disease process, etiology & prognosis.    - Proper usage and side effects of medications reviewed & discussed.    - Medical compliance with plan discussed and risks of non-compliance reviewed.    - Return to clinic as clinically indicated as discussed with patient who verbalized understanding of & agreement with the plan.     Nutrition and exercise:  Detailed  nutritional and exercise counseling done, patient handout given    Plan:   Advance Care Planning Addressed: Advance care planning and need for advance Directives, and DNR discussed with the patient.  Power of  form given   Total time spent discussing Advance Care Planning 5 minutes.   Health Maintenance (discussed and/or reviewed):   - Care Coordination: Done  - Patient Education: Done.    - Multiple ER/Acute Admissions: Discussed.    - Transportation Needs: Not needed, discussed options.   - Fall Risks Identified: None, all precautions discussed.  - Evaluate for respiratory program: Not applicable.    - Medication needs: Addressed.    - Social Concerns: None    Pain Screening (discussion and follow-up).   - Patient Education done, continue current medication.  Options were discussed, regarding pain management.  Patient Education (taught and/or recommended):.   - Patient educated on disease process, etiology & prognosis.    - Proper usage and side effects of medications reviewed & discussed.    - Medical compliance with plan discussed and risks of non-compliance reviewed.    - Return to clinic as clinically indicated as discussed with patient who verbalized understanding of & agreement with the plan.     Nutrition and exercise:  Detailed nutritional and exercise counseling done, patient handout given        Relevant Orders    ANNUAL WELLNESS VISIT SUBSEQUENT VISIT W PPS (Completed)      Other Visit Diagnoses             Routine lab draw        Screening for viral disease        Relevant Orders    HIV ANTIGEN/ANTIBODY SCREEN    HEPATITIS SEROLOGY PANEL ACUTE WITH REFLEX HCV PCR    Immunity status testing        Relevant Orders    HEPATITIS A AB, TOTAL W/REFL IGM          Schedule follow up: in 3 months    Screening schedule/checklist for next 5-10 years:  Health Maintenance Due   Topic Date Due   • Hepatitis B Vaccine (1 of 3 - 3-dose series) Never done   • Colorectal Cancer Screen-  09/10/2020   • Diabetes  Eye Exam  09/20/2022        A written education, counseling, referral, and plan for obtaining appropriate screening services has been given to patient. See patient instructions.    Patent

## 2023-08-01 NOTE — PROCEDURE NOTE - NSTIMEOUT_GEN_A_CORE
New patient, chief complaint spot lip minor problem just sprang up. Also notes growth lower lid, wanted removal, itches. OBJECTIVE: blanching red 2mm macule upper left lip, 3mm verrucous plaque lower right lid ASSESSMENT; venous lake, inflamed seb PLAN: reassurance  , The seb was destroyed by currettage and dessication   after discussion of recurrence and scarring without complication using sterile technique. Local care per routine Linh Espinal M.D.       Patient's first and last name, , procedure, and correct site confirmed prior to the start of procedure.

## 2023-11-14 VITALS — WEIGHT: 240.08 LBS | HEIGHT: 73 IN

## 2023-11-14 LAB
ACANTHOCYTES BLD QL SMEAR: SLIGHT — SIGNIFICANT CHANGE UP
ACANTHOCYTES BLD QL SMEAR: SLIGHT — SIGNIFICANT CHANGE UP
ALBUMIN SERPL ELPH-MCNC: 3.6 G/DL — SIGNIFICANT CHANGE UP (ref 3.3–5)
ALBUMIN SERPL ELPH-MCNC: 3.6 G/DL — SIGNIFICANT CHANGE UP (ref 3.3–5)
ALP SERPL-CCNC: 69 U/L — SIGNIFICANT CHANGE UP (ref 40–120)
ALP SERPL-CCNC: 69 U/L — SIGNIFICANT CHANGE UP (ref 40–120)
ALT FLD-CCNC: 52 U/L — SIGNIFICANT CHANGE UP (ref 12–78)
ALT FLD-CCNC: 52 U/L — SIGNIFICANT CHANGE UP (ref 12–78)
ANION GAP SERPL CALC-SCNC: 7 MMOL/L — SIGNIFICANT CHANGE UP (ref 5–17)
ANION GAP SERPL CALC-SCNC: 7 MMOL/L — SIGNIFICANT CHANGE UP (ref 5–17)
ANISOCYTOSIS BLD QL: SIGNIFICANT CHANGE UP
ANISOCYTOSIS BLD QL: SIGNIFICANT CHANGE UP
APPEARANCE UR: CLEAR — SIGNIFICANT CHANGE UP
APPEARANCE UR: CLEAR — SIGNIFICANT CHANGE UP
APTT BLD: 27.3 SEC — SIGNIFICANT CHANGE UP (ref 24.5–35.6)
APTT BLD: 27.3 SEC — SIGNIFICANT CHANGE UP (ref 24.5–35.6)
AST SERPL-CCNC: 79 U/L — HIGH (ref 15–37)
AST SERPL-CCNC: 79 U/L — HIGH (ref 15–37)
BASOPHILS # BLD AUTO: 0.07 K/UL — SIGNIFICANT CHANGE UP (ref 0–0.2)
BASOPHILS # BLD AUTO: 0.07 K/UL — SIGNIFICANT CHANGE UP (ref 0–0.2)
BASOPHILS NFR BLD AUTO: 1.7 % — SIGNIFICANT CHANGE UP (ref 0–2)
BASOPHILS NFR BLD AUTO: 1.7 % — SIGNIFICANT CHANGE UP (ref 0–2)
BILIRUB SERPL-MCNC: 0.6 MG/DL — SIGNIFICANT CHANGE UP (ref 0.2–1.2)
BILIRUB SERPL-MCNC: 0.6 MG/DL — SIGNIFICANT CHANGE UP (ref 0.2–1.2)
BILIRUB UR-MCNC: NEGATIVE — SIGNIFICANT CHANGE UP
BILIRUB UR-MCNC: NEGATIVE — SIGNIFICANT CHANGE UP
BLD GP AB SCN SERPL QL: SIGNIFICANT CHANGE UP
BLD GP AB SCN SERPL QL: SIGNIFICANT CHANGE UP
BUN SERPL-MCNC: 18 MG/DL — SIGNIFICANT CHANGE UP (ref 7–23)
BUN SERPL-MCNC: 18 MG/DL — SIGNIFICANT CHANGE UP (ref 7–23)
CALCIUM SERPL-MCNC: 8.5 MG/DL — SIGNIFICANT CHANGE UP (ref 8.5–10.1)
CALCIUM SERPL-MCNC: 8.5 MG/DL — SIGNIFICANT CHANGE UP (ref 8.5–10.1)
CHLORIDE SERPL-SCNC: 106 MMOL/L — SIGNIFICANT CHANGE UP (ref 96–108)
CHLORIDE SERPL-SCNC: 106 MMOL/L — SIGNIFICANT CHANGE UP (ref 96–108)
CO2 SERPL-SCNC: 27 MMOL/L — SIGNIFICANT CHANGE UP (ref 22–31)
CO2 SERPL-SCNC: 27 MMOL/L — SIGNIFICANT CHANGE UP (ref 22–31)
COLOR SPEC: YELLOW — SIGNIFICANT CHANGE UP
COLOR SPEC: YELLOW — SIGNIFICANT CHANGE UP
CREAT SERPL-MCNC: 1.03 MG/DL — SIGNIFICANT CHANGE UP (ref 0.5–1.3)
CREAT SERPL-MCNC: 1.03 MG/DL — SIGNIFICANT CHANGE UP (ref 0.5–1.3)
D DIMER BLD IA.RAPID-MCNC: 331 NG/ML DDU — HIGH
D DIMER BLD IA.RAPID-MCNC: 331 NG/ML DDU — HIGH
DIFF PNL FLD: NEGATIVE — SIGNIFICANT CHANGE UP
DIFF PNL FLD: NEGATIVE — SIGNIFICANT CHANGE UP
EGFR: 92 ML/MIN/1.73M2 — SIGNIFICANT CHANGE UP
EGFR: 92 ML/MIN/1.73M2 — SIGNIFICANT CHANGE UP
ELLIPTOCYTES BLD QL SMEAR: SLIGHT — SIGNIFICANT CHANGE UP
ELLIPTOCYTES BLD QL SMEAR: SLIGHT — SIGNIFICANT CHANGE UP
EOSINOPHIL # BLD AUTO: 0.07 K/UL — SIGNIFICANT CHANGE UP (ref 0–0.5)
EOSINOPHIL # BLD AUTO: 0.07 K/UL — SIGNIFICANT CHANGE UP (ref 0–0.5)
EOSINOPHIL NFR BLD AUTO: 1.7 % — SIGNIFICANT CHANGE UP (ref 0–6)
EOSINOPHIL NFR BLD AUTO: 1.7 % — SIGNIFICANT CHANGE UP (ref 0–6)
GLUCOSE SERPL-MCNC: 93 MG/DL — SIGNIFICANT CHANGE UP (ref 70–99)
GLUCOSE SERPL-MCNC: 93 MG/DL — SIGNIFICANT CHANGE UP (ref 70–99)
GLUCOSE UR QL: NEGATIVE MG/DL — SIGNIFICANT CHANGE UP
GLUCOSE UR QL: NEGATIVE MG/DL — SIGNIFICANT CHANGE UP
HCT VFR BLD CALC: 23.5 % — LOW (ref 39–50)
HCT VFR BLD CALC: 23.5 % — LOW (ref 39–50)
HGB BLD-MCNC: 6.1 G/DL — CRITICAL LOW (ref 13–17)
HGB BLD-MCNC: 6.1 G/DL — CRITICAL LOW (ref 13–17)
HYPOCHROMIA BLD QL: SIGNIFICANT CHANGE UP
HYPOCHROMIA BLD QL: SIGNIFICANT CHANGE UP
IMM GRANULOCYTES NFR BLD AUTO: 0.5 % — SIGNIFICANT CHANGE UP (ref 0–0.9)
IMM GRANULOCYTES NFR BLD AUTO: 0.5 % — SIGNIFICANT CHANGE UP (ref 0–0.9)
INR BLD: 0.99 RATIO — SIGNIFICANT CHANGE UP (ref 0.85–1.18)
INR BLD: 0.99 RATIO — SIGNIFICANT CHANGE UP (ref 0.85–1.18)
KETONES UR-MCNC: NEGATIVE MG/DL — SIGNIFICANT CHANGE UP
KETONES UR-MCNC: NEGATIVE MG/DL — SIGNIFICANT CHANGE UP
LEUKOCYTE ESTERASE UR-ACNC: NEGATIVE — SIGNIFICANT CHANGE UP
LEUKOCYTE ESTERASE UR-ACNC: NEGATIVE — SIGNIFICANT CHANGE UP
LYMPHOCYTES # BLD AUTO: 0.62 K/UL — LOW (ref 1–3.3)
LYMPHOCYTES # BLD AUTO: 0.62 K/UL — LOW (ref 1–3.3)
LYMPHOCYTES # BLD AUTO: 15.1 % — SIGNIFICANT CHANGE UP (ref 13–44)
LYMPHOCYTES # BLD AUTO: 15.1 % — SIGNIFICANT CHANGE UP (ref 13–44)
MAGNESIUM SERPL-MCNC: 1.9 MG/DL — SIGNIFICANT CHANGE UP (ref 1.6–2.6)
MAGNESIUM SERPL-MCNC: 1.9 MG/DL — SIGNIFICANT CHANGE UP (ref 1.6–2.6)
MANUAL SMEAR VERIFICATION: SIGNIFICANT CHANGE UP
MANUAL SMEAR VERIFICATION: SIGNIFICANT CHANGE UP
MCHC RBC-ENTMCNC: 18.4 PG — LOW (ref 27–34)
MCHC RBC-ENTMCNC: 18.4 PG — LOW (ref 27–34)
MCHC RBC-ENTMCNC: 26 GM/DL — LOW (ref 32–36)
MCHC RBC-ENTMCNC: 26 GM/DL — LOW (ref 32–36)
MCV RBC AUTO: 71 FL — LOW (ref 80–100)
MCV RBC AUTO: 71 FL — LOW (ref 80–100)
MICROCYTES BLD QL: SIGNIFICANT CHANGE UP
MICROCYTES BLD QL: SIGNIFICANT CHANGE UP
MONOCYTES # BLD AUTO: 0.4 K/UL — SIGNIFICANT CHANGE UP (ref 0–0.9)
MONOCYTES # BLD AUTO: 0.4 K/UL — SIGNIFICANT CHANGE UP (ref 0–0.9)
MONOCYTES NFR BLD AUTO: 9.8 % — SIGNIFICANT CHANGE UP (ref 2–14)
MONOCYTES NFR BLD AUTO: 9.8 % — SIGNIFICANT CHANGE UP (ref 2–14)
NEUTROPHILS # BLD AUTO: 2.92 K/UL — SIGNIFICANT CHANGE UP (ref 1.8–7.4)
NEUTROPHILS # BLD AUTO: 2.92 K/UL — SIGNIFICANT CHANGE UP (ref 1.8–7.4)
NEUTROPHILS NFR BLD AUTO: 71.2 % — SIGNIFICANT CHANGE UP (ref 43–77)
NEUTROPHILS NFR BLD AUTO: 71.2 % — SIGNIFICANT CHANGE UP (ref 43–77)
NITRITE UR-MCNC: NEGATIVE — SIGNIFICANT CHANGE UP
NITRITE UR-MCNC: NEGATIVE — SIGNIFICANT CHANGE UP
NT-PROBNP SERPL-SCNC: 40 PG/ML — SIGNIFICANT CHANGE UP (ref 0–125)
NT-PROBNP SERPL-SCNC: 40 PG/ML — SIGNIFICANT CHANGE UP (ref 0–125)
PH UR: 5.5 — SIGNIFICANT CHANGE UP (ref 5–8)
PH UR: 5.5 — SIGNIFICANT CHANGE UP (ref 5–8)
PLAT MORPH BLD: NORMAL — SIGNIFICANT CHANGE UP
PLAT MORPH BLD: NORMAL — SIGNIFICANT CHANGE UP
PLATELET # BLD AUTO: 207 K/UL — SIGNIFICANT CHANGE UP (ref 150–400)
PLATELET # BLD AUTO: 207 K/UL — SIGNIFICANT CHANGE UP (ref 150–400)
POIKILOCYTOSIS BLD QL AUTO: SIGNIFICANT CHANGE UP
POIKILOCYTOSIS BLD QL AUTO: SIGNIFICANT CHANGE UP
POTASSIUM SERPL-MCNC: 3.7 MMOL/L — SIGNIFICANT CHANGE UP (ref 3.5–5.3)
POTASSIUM SERPL-MCNC: 3.7 MMOL/L — SIGNIFICANT CHANGE UP (ref 3.5–5.3)
POTASSIUM SERPL-SCNC: 3.7 MMOL/L — SIGNIFICANT CHANGE UP (ref 3.5–5.3)
POTASSIUM SERPL-SCNC: 3.7 MMOL/L — SIGNIFICANT CHANGE UP (ref 3.5–5.3)
PROT SERPL-MCNC: 7.8 GM/DL — SIGNIFICANT CHANGE UP (ref 6–8.3)
PROT SERPL-MCNC: 7.8 GM/DL — SIGNIFICANT CHANGE UP (ref 6–8.3)
PROT UR-MCNC: NEGATIVE MG/DL — SIGNIFICANT CHANGE UP
PROT UR-MCNC: NEGATIVE MG/DL — SIGNIFICANT CHANGE UP
PROTHROM AB SERPL-ACNC: 11.2 SEC — SIGNIFICANT CHANGE UP (ref 9.5–13)
PROTHROM AB SERPL-ACNC: 11.2 SEC — SIGNIFICANT CHANGE UP (ref 9.5–13)
RBC # BLD: 3.31 M/UL — LOW (ref 4.2–5.8)
RBC # BLD: 3.31 M/UL — LOW (ref 4.2–5.8)
RBC # FLD: 20.4 % — HIGH (ref 10.3–14.5)
RBC # FLD: 20.4 % — HIGH (ref 10.3–14.5)
RBC BLD AUTO: ABNORMAL
RBC BLD AUTO: ABNORMAL
SCHISTOCYTES BLD QL AUTO: SLIGHT — SIGNIFICANT CHANGE UP
SCHISTOCYTES BLD QL AUTO: SLIGHT — SIGNIFICANT CHANGE UP
SODIUM SERPL-SCNC: 140 MMOL/L — SIGNIFICANT CHANGE UP (ref 135–145)
SODIUM SERPL-SCNC: 140 MMOL/L — SIGNIFICANT CHANGE UP (ref 135–145)
SP GR SPEC: 1.02 — SIGNIFICANT CHANGE UP (ref 1–1.03)
SP GR SPEC: 1.02 — SIGNIFICANT CHANGE UP (ref 1–1.03)
TARGETS BLD QL SMEAR: SLIGHT — SIGNIFICANT CHANGE UP
TARGETS BLD QL SMEAR: SLIGHT — SIGNIFICANT CHANGE UP
TROPONIN I, HIGH SENSITIVITY RESULT: 7.93 NG/L — SIGNIFICANT CHANGE UP
TROPONIN I, HIGH SENSITIVITY RESULT: 7.93 NG/L — SIGNIFICANT CHANGE UP
UROBILINOGEN FLD QL: 1 MG/DL — SIGNIFICANT CHANGE UP (ref 0.2–1)
UROBILINOGEN FLD QL: 1 MG/DL — SIGNIFICANT CHANGE UP (ref 0.2–1)
WBC # BLD: 4.1 K/UL — SIGNIFICANT CHANGE UP (ref 3.8–10.5)
WBC # BLD: 4.1 K/UL — SIGNIFICANT CHANGE UP (ref 3.8–10.5)
WBC # FLD AUTO: 4.1 K/UL — SIGNIFICANT CHANGE UP (ref 3.8–10.5)
WBC # FLD AUTO: 4.1 K/UL — SIGNIFICANT CHANGE UP (ref 3.8–10.5)

## 2023-11-14 PROCEDURE — 71045 X-RAY EXAM CHEST 1 VIEW: CPT | Mod: 26

## 2023-11-14 PROCEDURE — 93010 ELECTROCARDIOGRAM REPORT: CPT

## 2023-11-14 PROCEDURE — 99285 EMERGENCY DEPT VISIT HI MDM: CPT

## 2023-11-14 RX ORDER — CALCIUM CARBONATE 500(1250)
1 TABLET ORAL ONCE
Refills: 0 | Status: COMPLETED | OUTPATIENT
Start: 2023-11-14 | End: 2023-11-14

## 2023-11-14 RX ADMIN — Medication 1 TABLET(S): at 19:17

## 2023-11-14 NOTE — ED ADULT NURSE NOTE - OBJECTIVE STATEMENT
Patient presents to ED sent in by PCP for hemoglobin level of 6. Pt hx of iron deficiency anemia, states last time he received an iron infusion was 1.5 years ago, never had a blood transfusion. Endorses fatigue over past 2 weeks, endorses chest/epigastric pain that is relieved with Tums.

## 2023-11-14 NOTE — ED PROVIDER NOTE - PROGRESS NOTE DETAILS
I Debby Frias attest that this documentation has been prepared under the direction and in the presence of Doctor Clifford. Jelly Frias for attending Dr. Clifford: guaiac negative. Lot #[id number] expires 04/20/2024.

## 2023-11-14 NOTE — ED PROVIDER NOTE - CLINICAL SUMMARY MEDICAL DECISION MAKING FREE TEXT BOX
Plan will guaiac, get labs including troponin. Pt will likely stay if guaiac is positive, if guaiac is not positive will transfuse. Plan will guaiac, get labs including troponin. Pt will likely stay if guaiac is positive, if guaiac is not positive will transfuse.    Lorena - assumed care. After 2UPRBC pt reports feeling the same, still fatigued. When standing or ambulating HR goes up to 130-140, no syncope. Rpt Hgb only went to 7.1 from 6.1. Pt will require admission, further transfusions, GI consult

## 2023-11-14 NOTE — ED ADULT NURSE NOTE - NSFALLUNIVINTERV_ED_ALL_ED
Bed/Stretcher in lowest position, wheels locked, appropriate side rails in place/Call bell, personal items and telephone in reach/Instruct patient to call for assistance before getting out of bed/chair/stretcher/Non-slip footwear applied when patient is off stretcher/[place] to call system/Physically safe environment - no spills, clutter or unnecessary equipment/Purposeful proactive rounding/Room/bathroom lighting operational, light cord in reach

## 2023-11-14 NOTE — PROVIDER CONTACT NOTE (CRITICAL VALUE NOTIFICATION) - NAME OF MD/NP/PA/DO NOTIFIED:
Dr. Clifford [facility] 40  TELEPHONE ENCOUNTER FORM    Worthy Freed 1958    Attempted to call patient for HF follow-up. No answer at this time.       Next MD/ Clinic appointment: 12/16 with Melquiades Coy RN 12/2/2021 1:28 PM

## 2023-11-14 NOTE — ED PROVIDER NOTE - OBJECTIVE STATEMENT
42 yo male w/ no PMHx presents to the ED c/o fatigue decreased activity tolerance and chest pain. Pt told by MD Katz that his hemoglobin was 6.0. Pt had gastric bypass done in 2006 at [facility]. Pt has a known bleeding ulcer but has not noticed blood in his stool, no diarrhea or dark stools.  Pt had endoscopy done 2.5 years ago. P c/o midsternal chest discomfort, relieved by TUMS.

## 2023-11-14 NOTE — ED ADULT NURSE NOTE - NSFALLCONCLUSION_ED_ALL_ED
You can access the FollowMyHealth Patient Portal offered by [facility] by registering at the following website: [email or web address]. By joining [employer]’s FollowMyHealth portal, you will also be able to view your health information using other applications (apps) compatible with our system.
Universal Safety Interventions

## 2023-11-14 NOTE — ED PROVIDER NOTE - PHYSICAL EXAMINATION
Gen:  Well appearing in NAD  Head:  NC/AT  HEENT: pupils perrl,no pharyngeal erythema, uvula midline  Cardiac: S1S2, RRR  Abd: Soft, non tender  Resp: No distress, CTA   musculoskeletal:: no deformities, no swelling, strength +5/+5  Skin: warm and dry as visualized, no rashes  Neuro: STAPLES, aao x 4  Psych:alert, cooperative, appropriate mood and affect for situation

## 2023-11-15 ENCOUNTER — INPATIENT (INPATIENT)
Facility: HOSPITAL | Age: 43
LOS: 0 days | Discharge: HOME | DRG: 382 | End: 2023-11-16
Attending: STUDENT IN AN ORGANIZED HEALTH CARE EDUCATION/TRAINING PROGRAM | Admitting: STUDENT IN AN ORGANIZED HEALTH CARE EDUCATION/TRAINING PROGRAM
Payer: COMMERCIAL

## 2023-11-15 DIAGNOSIS — Z98.84 BARIATRIC SURGERY STATUS: Chronic | ICD-10-CM

## 2023-11-15 DIAGNOSIS — Z95.828 PRESENCE OF OTHER VASCULAR IMPLANTS AND GRAFTS: Chronic | ICD-10-CM

## 2023-11-15 DIAGNOSIS — D64.9 ANEMIA, UNSPECIFIED: ICD-10-CM

## 2023-11-15 LAB
ANION GAP SERPL CALC-SCNC: 6 MMOL/L — SIGNIFICANT CHANGE UP (ref 5–17)
ANION GAP SERPL CALC-SCNC: 6 MMOL/L — SIGNIFICANT CHANGE UP (ref 5–17)
BASOPHILS # BLD AUTO: 0.06 K/UL — SIGNIFICANT CHANGE UP (ref 0–0.2)
BASOPHILS # BLD AUTO: 0.06 K/UL — SIGNIFICANT CHANGE UP (ref 0–0.2)
BASOPHILS NFR BLD AUTO: 1.4 % — SIGNIFICANT CHANGE UP (ref 0–2)
BASOPHILS NFR BLD AUTO: 1.4 % — SIGNIFICANT CHANGE UP (ref 0–2)
BUN SERPL-MCNC: 13 MG/DL — SIGNIFICANT CHANGE UP (ref 7–23)
BUN SERPL-MCNC: 13 MG/DL — SIGNIFICANT CHANGE UP (ref 7–23)
CALCIUM SERPL-MCNC: 8.4 MG/DL — LOW (ref 8.5–10.1)
CALCIUM SERPL-MCNC: 8.4 MG/DL — LOW (ref 8.5–10.1)
CHLORIDE SERPL-SCNC: 102 MMOL/L — SIGNIFICANT CHANGE UP (ref 96–108)
CHLORIDE SERPL-SCNC: 102 MMOL/L — SIGNIFICANT CHANGE UP (ref 96–108)
CO2 SERPL-SCNC: 28 MMOL/L — SIGNIFICANT CHANGE UP (ref 22–31)
CO2 SERPL-SCNC: 28 MMOL/L — SIGNIFICANT CHANGE UP (ref 22–31)
CREAT SERPL-MCNC: 0.66 MG/DL — SIGNIFICANT CHANGE UP (ref 0.5–1.3)
CREAT SERPL-MCNC: 0.66 MG/DL — SIGNIFICANT CHANGE UP (ref 0.5–1.3)
EGFR: 119 ML/MIN/1.73M2 — SIGNIFICANT CHANGE UP
EGFR: 119 ML/MIN/1.73M2 — SIGNIFICANT CHANGE UP
EOSINOPHIL # BLD AUTO: 0.07 K/UL — SIGNIFICANT CHANGE UP (ref 0–0.5)
EOSINOPHIL # BLD AUTO: 0.07 K/UL — SIGNIFICANT CHANGE UP (ref 0–0.5)
EOSINOPHIL NFR BLD AUTO: 1.6 % — SIGNIFICANT CHANGE UP (ref 0–6)
EOSINOPHIL NFR BLD AUTO: 1.6 % — SIGNIFICANT CHANGE UP (ref 0–6)
GLUCOSE SERPL-MCNC: 98 MG/DL — SIGNIFICANT CHANGE UP (ref 70–99)
GLUCOSE SERPL-MCNC: 98 MG/DL — SIGNIFICANT CHANGE UP (ref 70–99)
HCT VFR BLD CALC: 25.5 % — LOW (ref 39–50)
HCT VFR BLD CALC: 25.5 % — LOW (ref 39–50)
HCT VFR BLD CALC: 26.5 % — LOW (ref 39–50)
HCT VFR BLD CALC: 26.5 % — LOW (ref 39–50)
HCT VFR BLD CALC: 28.5 % — LOW (ref 39–50)
HCT VFR BLD CALC: 28.5 % — LOW (ref 39–50)
HGB BLD-MCNC: 7.1 G/DL — LOW (ref 13–17)
HGB BLD-MCNC: 7.1 G/DL — LOW (ref 13–17)
HGB BLD-MCNC: 7.4 G/DL — LOW (ref 13–17)
HGB BLD-MCNC: 7.4 G/DL — LOW (ref 13–17)
HGB BLD-MCNC: 8.3 G/DL — LOW (ref 13–17)
HGB BLD-MCNC: 8.3 G/DL — LOW (ref 13–17)
IMM GRANULOCYTES NFR BLD AUTO: 0.2 % — SIGNIFICANT CHANGE UP (ref 0–0.9)
IMM GRANULOCYTES NFR BLD AUTO: 0.2 % — SIGNIFICANT CHANGE UP (ref 0–0.9)
LYMPHOCYTES # BLD AUTO: 0.48 K/UL — LOW (ref 1–3.3)
LYMPHOCYTES # BLD AUTO: 0.48 K/UL — LOW (ref 1–3.3)
LYMPHOCYTES # BLD AUTO: 10.9 % — LOW (ref 13–44)
LYMPHOCYTES # BLD AUTO: 10.9 % — LOW (ref 13–44)
MAGNESIUM SERPL-MCNC: 1.6 MG/DL — SIGNIFICANT CHANGE UP (ref 1.6–2.6)
MAGNESIUM SERPL-MCNC: 1.6 MG/DL — SIGNIFICANT CHANGE UP (ref 1.6–2.6)
MCHC RBC-ENTMCNC: 20.1 PG — LOW (ref 27–34)
MCHC RBC-ENTMCNC: 20.1 PG — LOW (ref 27–34)
MCHC RBC-ENTMCNC: 20.6 PG — LOW (ref 27–34)
MCHC RBC-ENTMCNC: 20.6 PG — LOW (ref 27–34)
MCHC RBC-ENTMCNC: 27.8 GM/DL — LOW (ref 32–36)
MCHC RBC-ENTMCNC: 27.8 GM/DL — LOW (ref 32–36)
MCHC RBC-ENTMCNC: 27.9 GM/DL — LOW (ref 32–36)
MCHC RBC-ENTMCNC: 27.9 GM/DL — LOW (ref 32–36)
MCV RBC AUTO: 72 FL — LOW (ref 80–100)
MCV RBC AUTO: 72 FL — LOW (ref 80–100)
MCV RBC AUTO: 73.6 FL — LOW (ref 80–100)
MCV RBC AUTO: 73.6 FL — LOW (ref 80–100)
MONOCYTES # BLD AUTO: 0.53 K/UL — SIGNIFICANT CHANGE UP (ref 0–0.9)
MONOCYTES # BLD AUTO: 0.53 K/UL — SIGNIFICANT CHANGE UP (ref 0–0.9)
MONOCYTES NFR BLD AUTO: 12.1 % — SIGNIFICANT CHANGE UP (ref 2–14)
MONOCYTES NFR BLD AUTO: 12.1 % — SIGNIFICANT CHANGE UP (ref 2–14)
NEUTROPHILS # BLD AUTO: 3.24 K/UL — SIGNIFICANT CHANGE UP (ref 1.8–7.4)
NEUTROPHILS # BLD AUTO: 3.24 K/UL — SIGNIFICANT CHANGE UP (ref 1.8–7.4)
NEUTROPHILS NFR BLD AUTO: 73.8 % — SIGNIFICANT CHANGE UP (ref 43–77)
NEUTROPHILS NFR BLD AUTO: 73.8 % — SIGNIFICANT CHANGE UP (ref 43–77)
PHOSPHATE SERPL-MCNC: 3.5 MG/DL — SIGNIFICANT CHANGE UP (ref 2.5–4.5)
PHOSPHATE SERPL-MCNC: 3.5 MG/DL — SIGNIFICANT CHANGE UP (ref 2.5–4.5)
PLATELET # BLD AUTO: 172 K/UL — SIGNIFICANT CHANGE UP (ref 150–400)
PLATELET # BLD AUTO: 172 K/UL — SIGNIFICANT CHANGE UP (ref 150–400)
PLATELET # BLD AUTO: 187 K/UL — SIGNIFICANT CHANGE UP (ref 150–400)
PLATELET # BLD AUTO: 187 K/UL — SIGNIFICANT CHANGE UP (ref 150–400)
POTASSIUM SERPL-MCNC: 3.6 MMOL/L — SIGNIFICANT CHANGE UP (ref 3.5–5.3)
POTASSIUM SERPL-MCNC: 3.6 MMOL/L — SIGNIFICANT CHANGE UP (ref 3.5–5.3)
POTASSIUM SERPL-SCNC: 3.6 MMOL/L — SIGNIFICANT CHANGE UP (ref 3.5–5.3)
POTASSIUM SERPL-SCNC: 3.6 MMOL/L — SIGNIFICANT CHANGE UP (ref 3.5–5.3)
RBC # BLD: 3.54 M/UL — LOW (ref 4.2–5.8)
RBC # BLD: 3.54 M/UL — LOW (ref 4.2–5.8)
RBC # BLD: 3.6 M/UL — LOW (ref 4.2–5.8)
RBC # BLD: 3.6 M/UL — LOW (ref 4.2–5.8)
RBC # FLD: 21.2 % — HIGH (ref 10.3–14.5)
RBC # FLD: 21.2 % — HIGH (ref 10.3–14.5)
RBC # FLD: 21.6 % — HIGH (ref 10.3–14.5)
RBC # FLD: 21.6 % — HIGH (ref 10.3–14.5)
SODIUM SERPL-SCNC: 136 MMOL/L — SIGNIFICANT CHANGE UP (ref 135–145)
SODIUM SERPL-SCNC: 136 MMOL/L — SIGNIFICANT CHANGE UP (ref 135–145)
WBC # BLD: 4.12 K/UL — SIGNIFICANT CHANGE UP (ref 3.8–10.5)
WBC # BLD: 4.12 K/UL — SIGNIFICANT CHANGE UP (ref 3.8–10.5)
WBC # BLD: 4.39 K/UL — SIGNIFICANT CHANGE UP (ref 3.8–10.5)
WBC # BLD: 4.39 K/UL — SIGNIFICANT CHANGE UP (ref 3.8–10.5)
WBC # FLD AUTO: 4.12 K/UL — SIGNIFICANT CHANGE UP (ref 3.8–10.5)
WBC # FLD AUTO: 4.12 K/UL — SIGNIFICANT CHANGE UP (ref 3.8–10.5)
WBC # FLD AUTO: 4.39 K/UL — SIGNIFICANT CHANGE UP (ref 3.8–10.5)
WBC # FLD AUTO: 4.39 K/UL — SIGNIFICANT CHANGE UP (ref 3.8–10.5)

## 2023-11-15 PROCEDURE — C9113: CPT

## 2023-11-15 PROCEDURE — 36430 TRANSFUSION BLD/BLD COMPNT: CPT

## 2023-11-15 PROCEDURE — 83735 ASSAY OF MAGNESIUM: CPT

## 2023-11-15 PROCEDURE — 85018 HEMOGLOBIN: CPT

## 2023-11-15 PROCEDURE — 85027 COMPLETE CBC AUTOMATED: CPT

## 2023-11-15 PROCEDURE — 85014 HEMATOCRIT: CPT

## 2023-11-15 PROCEDURE — 84100 ASSAY OF PHOSPHORUS: CPT

## 2023-11-15 PROCEDURE — P9016: CPT

## 2023-11-15 PROCEDURE — 80048 BASIC METABOLIC PNL TOTAL CA: CPT

## 2023-11-15 PROCEDURE — 85025 COMPLETE CBC W/AUTO DIFF WBC: CPT

## 2023-11-15 PROCEDURE — 86923 COMPATIBILITY TEST ELECTRIC: CPT

## 2023-11-15 PROCEDURE — 43235 EGD DIAGNOSTIC BRUSH WASH: CPT | Mod: GC

## 2023-11-15 PROCEDURE — 36415 COLL VENOUS BLD VENIPUNCTURE: CPT

## 2023-11-15 PROCEDURE — 99222 1ST HOSP IP/OBS MODERATE 55: CPT

## 2023-11-15 RX ORDER — LANOLIN ALCOHOL/MO/W.PET/CERES
3 CREAM (GRAM) TOPICAL AT BEDTIME
Refills: 0 | Status: DISCONTINUED | OUTPATIENT
Start: 2023-11-15 | End: 2023-11-16

## 2023-11-15 RX ORDER — MAGNESIUM SULFATE 500 MG/ML
1 VIAL (ML) INJECTION ONCE
Refills: 0 | Status: COMPLETED | OUTPATIENT
Start: 2023-11-15 | End: 2023-11-15

## 2023-11-15 RX ORDER — SUCRALFATE 1 G
1 TABLET ORAL
Refills: 0 | Status: DISCONTINUED | OUTPATIENT
Start: 2023-11-15 | End: 2023-11-16

## 2023-11-15 RX ORDER — PANTOPRAZOLE SODIUM 20 MG/1
80 TABLET, DELAYED RELEASE ORAL ONCE
Refills: 0 | Status: COMPLETED | OUTPATIENT
Start: 2023-11-15 | End: 2023-11-15

## 2023-11-15 RX ORDER — PANTOPRAZOLE SODIUM 20 MG/1
8 TABLET, DELAYED RELEASE ORAL
Qty: 80 | Refills: 0 | Status: DISCONTINUED | OUTPATIENT
Start: 2023-11-15 | End: 2023-11-15

## 2023-11-15 RX ORDER — PANTOPRAZOLE SODIUM 20 MG/1
40 TABLET, DELAYED RELEASE ORAL
Refills: 0 | Status: DISCONTINUED | OUTPATIENT
Start: 2023-11-15 | End: 2023-11-16

## 2023-11-15 RX ORDER — CALCIUM CARBONATE 500(1250)
2 TABLET ORAL
Refills: 0 | DISCHARGE

## 2023-11-15 RX ORDER — ONDANSETRON 8 MG/1
4 TABLET, FILM COATED ORAL EVERY 6 HOURS
Refills: 0 | Status: DISCONTINUED | OUTPATIENT
Start: 2023-11-15 | End: 2023-11-16

## 2023-11-15 RX ADMIN — PANTOPRAZOLE SODIUM 80 MILLIGRAM(S): 20 TABLET, DELAYED RELEASE ORAL at 04:58

## 2023-11-15 RX ADMIN — Medication 100 GRAM(S): at 18:06

## 2023-11-15 RX ADMIN — PANTOPRAZOLE SODIUM 10 MG/HR: 20 TABLET, DELAYED RELEASE ORAL at 04:58

## 2023-11-15 RX ADMIN — Medication 3 MILLIGRAM(S): at 21:42

## 2023-11-15 RX ADMIN — Medication 1 GRAM(S): at 18:09

## 2023-11-15 RX ADMIN — PANTOPRAZOLE SODIUM 40 MILLIGRAM(S): 20 TABLET, DELAYED RELEASE ORAL at 21:42

## 2023-11-15 NOTE — PROGRESS NOTE ADULT - ASSESSMENT
43 year old man with hx of Addis-en-Y gastric bypass in 2006, prophylactic IVC filter placement at that time, hx of rib fracture with traumatic pneumothorax in 2022, hx of bleeding peptic ulcer thought to be related to alcohol and NSAID use, sent to  ED by outpatient provider on 11/14 when patient was noted to have low Hgb on blood work. The blood work was initially sent to further evaluate recent complaints of fatigue, decreased exercise tolerance, and epigastric discomfort worsened with food intake and relieved with TUMS. He still consumes alcohol but denies heavy use. Last drank this past Saturday, 2 cocktails while out in [facility]. In the ED, BP was 145/96, , other vitals WNL. Exam was notable for pallor, mild epigastric tenderness without rebound or guarding. Hgb was 6.1. Plts WNL. Coags WNL. Troponin negative. Patient was ordered for PRBC transfusion, IV PPI, and admitted to Medicine.    Symptomatic anemia, marginal ulcer albeit without high risk bleeding stigmata   Patient is now s/p 3u PRBCs. Hgb 8.3. Feeling improved. No overt bleeding at this point. No dizziness, lightheadedness or palpitations. Mild epigastric discomfort. Underwent EGD today. Found to have 2cm marginal ulcer at anastomosis without high risk bleeding stigmata (hx of gastric bypass). Appreciate input from GI   - Continue PPI, now BID  - Begin Carafate QID  - Clears today, diet advancement tomorrow as tolerated pending clinical course        43 year old man with hx of Addis-en-Y gastric bypass in 2006, prophylactic IVC filter placement at that time, hx of rib fracture with traumatic pneumothorax in 2022, hx of bleeding peptic ulcer thought to be related to alcohol and NSAID use, sent to  ED by outpatient provider on 11/14 when patient was noted to have low Hgb on blood work. The blood work was initially sent to further evaluate recent complaints of fatigue, decreased exercise tolerance, and epigastric discomfort worsened with food intake and relieved with TUMS. He still consumes alcohol but denies heavy use. Last drank this past Saturday, 2 cocktails while out in [facility]. In the ED, BP was 145/96, , other vitals WNL. Exam was notable for pallor, mild epigastric tenderness without rebound or guarding. Hgb was 6.1. Plts WNL. Coags WNL. Troponin negative. Patient was ordered for PRBC transfusions, IV PPI. Admitted to Medicine.    Symptomatic anemia, marginal ulcer albeit without high risk bleeding stigmata   Patient is now s/p 4u PRBCs. Hgb 8.3. Feeling improved. No overt bleeding at this point. No dizziness, lightheadedness or palpitations. Mild epigastric discomfort. Underwent EGD today. Found to have 2cm marginal ulcer at anastomosis without high risk bleeding stigmata (hx of gastric bypass). Appreciate input from GI   - Continue PPI, now BID  - Begin Carafate QID  - Clears today, diet advancement tomorrow as tolerated pending clinical course

## 2023-11-15 NOTE — H&P ADULT - HISTORY OF PRESENT ILLNESS
43 year old male w hx gastric bypass, PUD presents to ED c/o fatigue, decreased activity tolerance and chest pain.     Pt told by MD Katz that his hemoglobin was 6.0  known bleeding ulcer hx; cut back on alcohol and has not used aspirin or NSAIDs  denied nausea, vomiting, melena or blood per rectum  +lower chest/ epigastric pain, relieved w TUMS  used 8 tabs today  last drank 2 old fashion cocktails on Saturday when in [facility]      In ED /96     RR 16   T 97.6   100% sat RA  guaiac neg in ED  CXR no acute changes  Hb 6.1 increased to 7.1 after 2 units PRBCs  3rd unit hanging    PAST MEDICAL HX  Alcohol use  PUD peptic ulcer disease   Rib fractures R -      PAST SURGICAL HX  H/O gastric bypass : [place]   S/P IVC filter prophylactically placed at time of gastric bypass  R chest tube : traumatic pneumothorax w associated rib fractures   endoscopy done 2.5 years ago      FAMILY HX  heart disease : Father  age 58  diabetes : Maternal family  testicular CA : Father,  survived 13 yrs post chemo and RT      SOCIAL HX  nonsmoker  alcohol use : not every day and drinks less   43 year old male w hx gastric bypass, IVC filter, PUD presents to ED c/o fatigue, decreased activity tolerance and chest pain.     Pt told by MD Katz that his hemoglobin was 6.0  known bleeding ulcer hx; cut back on alcohol and has not used aspirin or NSAIDs  denied nausea, vomiting, melena or blood per rectum  +lower chest/ epigastric pain, relieved w TUMS  used 8 tabs today  has been feeling cold  last drank 2 old fashion cocktails on Saturday when in [facility]      In ED /96     RR 16   T 97.6   100% sat RA  guaiac neg in ED  CXR no acute changes  Hb 6.1 increased to 7.1 after 2 units PRBCs  3rd unit hanging    PAST MEDICAL HX  Alcohol use  PUD peptic ulcer disease   Rib fractures R -      PAST SURGICAL HX  H/O gastric bypass : [place]-Y   S/P IVC filter ([place]) prophylactically placed at time of gastric bypass  R chest tube : traumatic pneumothorax w associated rib fractures -  endoscopy done 2.5 years ago      FAMILY HX  heart disease : Father  age 58  diabetes : Maternal family  testicular CA : Father,  survived 13 yrs post chemo and RT      SOCIAL HX  nonsmoker  alcohol use : not every day and drinks less

## 2023-11-15 NOTE — CONSULT NOTE ADULT - SUBJECTIVE AND OBJECTIVE BOX
Patient is a 43y old  Male who presents with a chief complaint of symptomatic anemia  epigastric pain (15 Nov 2023 04:13)      HPI:  43 year old male w hx gastric bypass, IVC filter, PUD presents to ED c/o fatigue, decreased activity tolerance and chest pain.     Pt told by MD Katz that his hemoglobin was 6.0  known bleeding ulcer hx; cut back on alcohol and has not used aspirin or NSAIDs  denied nausea, vomiting, melena or blood per rectum  +lower chest/ epigastric pain, relieved w TUMS  used 8 tabs today  has been feeling cold  last drank 2 old fashion cocktails on Saturday when in [facility]      In ED /96     RR 16   T 97.6   100% sat RA  guaiac neg in ED  CXR no acute changes  Hb 6.1 increased to 7.1 after 2 units PRBCs  3rd unit hanging    PAST MEDICAL HX  Alcohol use  PUD peptic ulcer disease   Rib fractures R 7-8-9      PAST SURGICAL HX  H/O gastric bypass : [place]is-en-Y   S/P IVC filter ([place]) prophylactically placed at time of gastric bypass  R chest tube : traumatic pneumothorax w associated rib fractures   endoscopy done 2.5 years ago      FAMILY HX  heart disease : Father  age 58  diabetes : Maternal family  testicular CA : Father,  survived 13 yrs post chemo and RT      SOCIAL HX  nonsmoker  alcohol use : not every day and drinks less   (15 Nov 2023 04:13)      PAST MEDICAL & SURGICAL HISTORY:  Admits to alcohol use      H/O gastric bypass      S/P IVC filter          MEDICATIONS  (STANDING):  pantoprazole Infusion 8 mG/Hr (10 mL/Hr) IV Continuous <Continuous>    MEDICATIONS  (PRN):  ondansetron Injectable 4 milliGRAM(s) IV Push every 6 hours PRN Nausea and/or Vomiting      Allergies    No Known Allergies    Intolerances        SOCIAL HISTORY:    FAMILY HISTORY:  FH: CAD (coronary artery disease) (Father)    Family history of CVA (Father)        REVIEW OF SYSTEMS:    CONSTITUTIONAL: No weakness, fevers or chills  EYES/ENT: No visual changes;  No vertigo or throat pain   NECK: No pain or stiffness  RESPIRATORY: No cough, wheezing, hemoptysis; No shortness of breath  CARDIOVASCULAR: No chest pain or palpitations  GASTROINTESTINAL: No abdominal or epigastric pain. No nausea, vomiting, or hematemesis; No diarrhea or constipation. No melena or hematochezia.  GENITOURINARY: No dysuria, frequency or hematuria  NEUROLOGICAL: No numbness or weakness  SKIN: No itching, burning, rashes, or lesions   PSYCH: Normal mood and affect  All other review of systems is negative unless indicated above.    Vital Signs Last 24 Hrs  T(C): 37.1 (15 Nov 2023 09:29), Max: 37.4 (15 Nov 2023 06:45)  T(F): 98.8 (15 Nov 2023 09:), Max: 99.3 (15 Nov 2023 06:45)  HR: 81 (15 Nov 2023 09:) (71 - 102)  BP: 151/88 (15 Nov 2023 09:29) (135/79 - 153/75)  BP(mean): 100 (2023 18:40) (100 - 108)  RR: 18 (15 Nov 2023 09:) (14 - 18)  SpO2: 100% (15 Nov 2023 09:29) (97% - 100%)    Parameters below as of 15 Nov 2023 09:29  Patient On (Oxygen Delivery Method): room air        PHYSICAL EXAM:    Constitutional: No acute distress, well-developed, non-toxic appearing  HEENT: masked, good phonation, not icteric  Neck: supple, no lymphadenopathy  Respiratory: clear to ascultation bilaterally, no wheezing  Cardiovascular: S1 and S2, regular rate and rhythm, no murmurs rubs or gallops  Gastrointestinal: soft, non-tender, non-distended, +bowel sounds, no rebound or guarding, no surgical scars, no drains  Extremities: No peripheral edema, no cyanosis or clubbing  Vascular: 2+ peripheral pulses, no venous stasis  Neurological: A/O x 3, no focal deficits, no asterixis  Psychiatric: Normal mood, normal affect  Skin: No rashes, not jaundiced    LABS:                        7.4    4.39  )-----------( 172      ( 15 Nov 2023 06:53 )             26.5     11-15    136  |  102  |  13  ----------------------------<  98  3.6   |  28  |  0.66    Ca    8.4<L>      15 Nov 2023 06:53  Phos  3.5     11-15  Mg     1.6     11-15    TPro  7.8  /  Alb  3.6  /  TBili  0.6  /  DBili  x   /  AST  79<H>  /  ALT  52  /  AlkPhos  69  11-14    PT/INR - ( 2023 15:20 )   PT: 11.2 sec;   INR: 0.99 ratio         PTT - ( 2023 15:20 )  PTT:27.3 sec  LIVER FUNCTIONS - ( 2023 15:20 )  Alb: 3.6 g/dL / Pro: 7.8 gm/dL / ALK PHOS: 69 U/L / ALT: 52 U/L / AST: 79 U/L / GGT: x             RADIOLOGY & ADDITIONAL STUDIES: Patient is a 43y old  Male who presents with a chief complaint of symptomatic anemia    HPI:  This is a 43 year old male with history of gastric bypass 2006, IVC filter, PUD presenting to [facility] with fatigue, decreased activity tolerance, chest pain with Hgb 6.0 from outpatient labs ordered by PCP Dr. Katz. Patient evaluated at bedside this morning, being transfused. Endorses epigastric pain intermittent with no known aggravating or alleviating factors. Still admitting he feels weak. History EGD 2019 with bleeding ulcer at anastomosis, per patient. Is not on PPI. Denies hematemesis, hematochezia, or melena. Admits to heavy etoh in past, has cut back since 2019 GIB. Last drink Saturday. Hgb 7.4 from 6.1. s/p 3 units prbc.    PAST MEDICAL & SURGICAL HISTORY:  Admits to alcohol use    H/O gastric bypass    S/P IVC filter    MEDICATIONS  (STANDING):  pantoprazole Infusion 8 mG/Hr (10 mL/Hr) IV Continuous <Continuous>    MEDICATIONS  (PRN):  ondansetron Injectable 4 milliGRAM(s) IV Push every 6 hours PRN Nausea and/or Vomiting    Allergies    No Known Allergies    Intolerances    SOCIAL HISTORY:    FAMILY HISTORY:  FH: CAD (coronary artery disease) (Father)    Family history of CVA (Father)    REVIEW OF SYSTEMS:    CONSTITUTIONAL: No weakness, fevers or chills  EYES/ENT: No visual changes;  No vertigo or throat pain   NECK: No pain or stiffness  RESPIRATORY: No cough, wheezing, hemoptysis; No shortness of breath  CARDIOVASCULAR: No chest pain or palpitations  GASTROINTESTINAL: See HPI  GENITOURINARY: No dysuria, frequency or hematuria  NEUROLOGICAL: No numbness or weakness  SKIN: No itching, burning, rashes, or lesions   PSYCH: Normal mood and affect  All other review of systems is negative unless indicated above.    Vital Signs Last 24 Hrs  T(C): 37.1 (15 Nov 2023 09:29), Max: 37.4 (15 Nov 2023 06:45)  T(F): 98.8 (15 Nov 2023 09:29), Max: 99.3 (15 Nov 2023 06:45)  HR: 81 (15 Nov 2023 09:29) (71 - 102)  BP: 151/88 (15 Nov 2023 09:29) (135/79 - 153/75)  BP(mean): 100 (14 Nov 2023 18:40) (100 - 108)  RR: 18 (15 Nov 2023 09:29) (14 - 18)  SpO2: 100% (15 Nov 2023 09:29) (97% - 100%)    Parameters below as of 15 Nov 2023 09:29  Patient On (Oxygen Delivery Method): room air    PHYSICAL EXAM:    Constitutional: No acute distress, well-developed, non-toxic appearing  HEENT: masked, good phonation, not icteric  Neck: supple, no lymphadenopathy  Respiratory: clear to ascultation bilaterally, no wheezing  Cardiovascular: S1 and S2, regular rate and rhythm, no murmurs rubs or gallops  Gastrointestinal: soft, epig TTP, non-distended, +bowel sounds, no rebound or guarding, no surgical scars, no drains  Extremities: No peripheral edema, no cyanosis or clubbing  Vascular: 2+ peripheral pulses, no venous stasis  Neurological: A/O x 3, no focal deficits, no asterixis  Psychiatric: Normal mood, normal affect  Skin: No rashes, not jaundiced    LABS:                        7.4    4.39  )-----------( 172      ( 15 Nov 2023 06:53 )             26.5     11-15    136  |  102  |  13  ----------------------------<  98  3.6   |  28  |  0.66    Ca    8.4<L>      15 Nov 2023 06:53  Phos  3.5     11-15  Mg     1.6     11-15    TPro  7.8  /  Alb  3.6  /  TBili  0.6  /  DBili  x   /  AST  79<H>  /  ALT  52  /  AlkPhos  69  11-14    PT/INR - ( 14 Nov 2023 15:20 )   PT: 11.2 sec;   INR: 0.99 ratio         PTT - ( 14 Nov 2023 15:20 )  PTT:27.3 sec  LIVER FUNCTIONS - ( 14 Nov 2023 15:20 )  Alb: 3.6 g/dL / Pro: 7.8 gm/dL / ALK PHOS: 69 U/L / ALT: 52 U/L / AST: 79 U/L / GGT: x             RADIOLOGY & ADDITIONAL STUDIES:

## 2023-11-15 NOTE — H&P ADULT - NSHPPHYSICALEXAM_GEN_ALL_CORE
Vital Signs Last 24 Hrs  T(C): 36.9 (15 Nov 2023 03:50), Max: 37 (14 Nov 2023 20:30)  T(F): 98.4 (15 Nov 2023 03:50), Max: 98.6 (14 Nov 2023 20:30)  HR: 81 (15 Nov 2023 03:50) (71 - 102)  BP: 145/85 (15 Nov 2023 03:50) (135/79 - 148/70)  BP(mean): 100 (14 Nov 2023 18:40) (100 - 108)  RR: 17 (15 Nov 2023 03:50) (16 - 18)  SpO2: 98% (15 Nov 2023 03:50) (97% - 100%)    Parameters below as of 15 Nov 2023 03:50  Patient On (Oxygen Delivery Method): room air

## 2023-11-15 NOTE — PROGRESS NOTE ADULT - SUBJECTIVE AND OBJECTIVE BOX
Chief Complaint: Fatigue, decreased activity tolerance, epigastric discomfort    Interval Hx: Patient seen and examined this AM. Now PRBC transfusions. Hgb increased to 8.3. Patient reports feeling improved. Much less fatigue and malaise. No dizziness, lightheadedness or palpitations. No chest pain or tightness. He did continue to report mild epigastric discomfort, had been worse with eating of late. No other complaints. Patient has since undergone EGD this afternoon. Found to have 2cm marginal ulcer at anastomosis without high risk bleeding stigmata (hx of gastric bypass). GI has recommended continued PPI BID, Carafate, clears today, diet advancement tomorrow as tolerated pending clinical course.       ROS: Multi system review is comprehensively negative x 10 systems except as above    Vitals:  T(F): 98.8 (15 Nov 2023 09:29), Max: 99.3 (15 Nov 2023 06:45)  HR: 81 (15 Nov 2023 09:29) (71 - 102)  BP: 151/88 (15 Nov 2023 09:29) (135/79 - 153/75)  RR: 18 (15 Nov 2023 09:29) (14 - 18)  SpO2: 100% (15 Nov 2023 09:29) (97% - 100%) on room air    Exam:  Gen: Comfortable appearing  HEENT: NCAT PERRL EOMI MMM clear oropharynx  Neck: Supple, no JVD, no LAD  CVS: s1 s2 normal, RRR  Chest: Normal resp effort, lungs CTA B/L  Abd: +BS, soft, nontender, nondistended  Ext: No edema or calf tenderness, normal cap refill, no clubbing  Skin: Warm, dry  Mood: Calm, pleasant  Neuro: A+Ox3, no deficits    Labs:    H/H  8.3/28.5  this afternoon               7.4    4.39 )---------( 172             26.5       136  |  102  |  13  -----------------------<  98  3.6   |   28   |  0.66    Ca 8.4    Phos 3.5    Mg 1.6    TPro  7.8  /  Alb  3.6  /  TBili  0.6  /  DBili  x   /  AST  79  /  ALT  52  /  AlkPhos  69      PT: 11.2 sec;   INR: 0.99 ratio   PTT: 27.3 sec    Troponin negative   proBNP WNL    Ddimer negative    Imaging:  CXR 11/14: Visualized lungs are clear of airspace consolidations or pleural effusions. No pneumothorax. Heart and mediastinum size and configuration are within normal limits. Visualized osseous thorax intact.    Cardiac Testing:  EKG 11/14: Rate 92, NSR, septal q    Procedures:  EGD 11/15: 2cm marginal ulcer at anastomosis without high risk bleeding stigmata     Meds:  MEDICATIONS  (STANDING):  pantoprazole  Injectable 40 milliGRAM(s) IV Push two times a day  sucralfate suspension 1 Gram(s) Oral four times a day    MEDICATIONS  (PRN):  ondansetron Injectable 4 milliGRAM(s) IV Push every 6 hours PRN Nausea and/or Vomiting   Chief Complaint: Fatigue, decreased activity tolerance, epigastric discomfort    Interval Hx: Patient seen and examined this AM. Now s/p multiple PRBC transfusions. Hgb increased to 8.3. Patient reports feeling improved. Much less fatigue and malaise. No dizziness, lightheadedness or palpitations. No chest pain or tightness. He did continue to report mild epigastric discomfort, had been worse with eating of late. No other complaints. Patient has since undergone EGD this afternoon. Found to have 2cm marginal ulcer at anastomosis without high risk bleeding stigmata (hx of gastric bypass). GI has recommended continued PPI BID, Carafate, clears today, diet advancement tomorrow as tolerated pending clinical course.       ROS: Multi system review is comprehensively negative x 10 systems except as above    Vitals:  T(F): 98.8 (15 Nov 2023 09:29), Max: 99.3 (15 Nov 2023 06:45)  HR: 81 (15 Nov 2023 09:29) (71 - 102)  BP: 151/88 (15 Nov 2023 09:29) (135/79 - 153/75)  RR: 18 (15 Nov 2023 09:29) (14 - 18)  SpO2: 100% (15 Nov 2023 09:29) (97% - 100%) on room air    Exam:  Gen: Comfortable appearing  HEENT: NCAT PERRL EOMI MMM clear oropharynx  Neck: Supple, no JVD, no LAD  CVS: s1 s2 normal, RRR  Chest: Normal resp effort, lungs CTA B/L  Abd: +BS, soft, nontender, nondistended  Ext: No edema or calf tenderness, normal cap refill, no clubbing  Skin: Warm, dry  Mood: Calm, pleasant  Neuro: A+Ox3, no deficits    Labs:    H/H  8.3/28.5  this afternoon               7.4    4.39 )---------( 172             26.5       136  |  102  |  13  -----------------------<  98  3.6   |   28   |  0.66    Ca 8.4    Phos 3.5    Mg 1.6    TPro  7.8  /  Alb  3.6  /  TBili  0.6  /  DBili  x   /  AST  79  /  ALT  52  /  AlkPhos  69      PT: 11.2 sec;   INR: 0.99 ratio   PTT: 27.3 sec    Troponin negative   proBNP WNL    Ddimer negative    Imaging:  CXR 11/14: Visualized lungs are clear of airspace consolidations or pleural effusions. No pneumothorax. Heart and mediastinum size and configuration are within normal limits. Visualized osseous thorax intact.    Cardiac Testing:  EKG 11/14: Rate 92, NSR, septal q    Procedures:  EGD 11/15: 2cm marginal ulcer at anastomosis without high risk bleeding stigmata     Meds:  MEDICATIONS  (STANDING):  pantoprazole  Injectable 40 milliGRAM(s) IV Push two times a day  sucralfate suspension 1 Gram(s) Oral four times a day    MEDICATIONS  (PRN):  ondansetron Injectable 4 milliGRAM(s) IV Push every 6 hours PRN Nausea and/or Vomiting

## 2023-11-15 NOTE — CONSULT NOTE ADULT - ASSESSMENT
42 yo M w/ heartburn & h/o gastric bypass for morbid obesity (2006, at [facility]) w/ prophylactic IVC filter placed prior to that surgery, whose symptomatic anemia is likely to marginal ulcer that was not actively bleeding during endoscopy today. Stable while awaiting diet advancement and ulcer medical management.    -PPI, carafate & diet per GI  -Serial exam  -If H/H stable, likely to be sufficiently management medically and can f/u with office 2 weeks after discharge    Discussed with Dr. Rowan
43 year old male with history gastric bypass, ulceration at anastomosis 2019 with symptomatic anemia and epigastric/chest pain    Imp: Symptomatic anemia 2/2 poss UGIB    Rec:  ::NPO  ::EGD today  ::Trend HH and transfuse for Hgb <7  ::PPI gtt

## 2023-11-15 NOTE — SBIRT NOTE ADULT - NSSBIRTOFTENALCOHOL_GEN_A_CORE
Pt reports decreased social drinking, last drank 2 old fashion cocktails on Saturday when in [facility]. Pt denies excessive consumption.

## 2023-11-15 NOTE — H&P ADULT - ASSESSMENT
43 year old male w hx gastric bypass, IVC filter, PUD presents to ED c/o fatigue, decreased activity tolerance and chest pain.         #Symptomatic anemia  #Epigastric pain  #hx PUD  #Hx Gastric bypass  1. admit to med  2. protonix  3. transfused 2 units w/o significant change in Hb  4. 3rd unit transfusing  5. GI consult  6. NPO      #IVC filter  was prophylactic      #Alcohol use  last drank Saturday  not tremulous  1. observe        #VTE  low risk  and likely bleeding      #55 minutes 43 year old male w hx gastric bypass, IVC filter, PUD presents to ED c/o fatigue, decreased activity tolerance and chest pain.         #Symptomatic anemia  #Epigastric pain  #hx PUD  #Hx Gastric bypass  1. admit to med  2. protonix  3. transfused 2 units w/o significant change in Hb  4. 3rd unit transfusing  5. GI consult  6. NPO : has been since evening  7. trend CBC      #IVC filter  was prophylactic      #Alcohol use  last drank Saturday  not tremulous  1. observe        #VTE  low risk  and likely bleeding      #55 minutes

## 2023-11-15 NOTE — PATIENT PROFILE ADULT - ARRIVAL FROM
Assessment/Plan:      Quality Measures:     BMI Counseling: Body mass index is 56 1 kg/m²  The BMI is above normal  Nutrition recommendations include decreasing portion sizes and encouraging healthy choices of fruits and vegetables  Exercise recommendations include moderate physical activity 150 minutes/week  Rationale for BMI follow-up plan is due to patient being overweight or obese  Depression Screening and Follow-up Plan: Patient was screened for depression during today's encounter  They screened negative with a PHQ-2 score of 0  Return in about 6 months (around 8/22/2022)  No problem-specific Assessment & Plan notes found for this encounter  Diagnoses and all orders for this visit:    Essential hypertension  -     lisinopril-hydrochlorothiazide (PRINZIDE,ZESTORETIC) 20-12 5 MG per tablet; Take 2 tablets by mouth daily  -     metoprolol succinate (TOPROL-XL) 100 mg 24 hr tablet; Take 1 tablet (100 mg total) by mouth daily  -     CBC and differential; Future  -     Comprehensive metabolic panel; Future  -     CBC and differential; Future  -     Comprehensive metabolic panel; Future    Anxiety  -     sertraline (ZOLOFT) 100 mg tablet; Take 1 tablet (100 mg total) by mouth daily    Chronic venous hypertension (idiopathic) with ulcer and inflammation of left lower extremity (CODE) (HCC)    Morbid obesity with BMI of 50 0-59 9, adult (HCC)    Obstructive sleep apnea treated with continuous positive airway pressure (CPAP)  -     CBC and differential; Future  -     Comprehensive metabolic panel; Future    Encounter for lipid screening for cardiovascular disease  -     Lipid Panel with Direct LDL reflex; Future  -     Lipid Panel with Direct LDL reflex; Future    Encounter for hepatitis C screening test for low risk patient  -     Hepatitis C antibody;  Future    Encounter for screening for HIV  -     HIV 1/2 Antigen/Antibody (4th Generation) w Reflex [facility]; Future    Abnormal fasting glucose  - Hemoglobin A1C; Future  -     Hemoglobin A1C; Future    Prostate cancer screening  -     PSA, total and free; Future          Subjective:      Patient ID: Michael Torres is a 61 y o  male  Shannon Adair is a 62 yo male who is here to establish care  He is accompanied by his wife  PMH includes HTN, anxiety, allergies, chronic pain r/t back pain, and venous wound to his LLE  Following with sleep medicine and wound care  He has his wound changed every week  We discussed his blood pressure and how it appears that it is not controlled  He is agreeable to adding another agent to his regimen  He had a colonoscopy done by Dr Nabila Tavares and we will obtain results  He will come in next week for BP check  Reports that he and his wife do drink too much  3 shots of liquor a day  Trying to cut down  Aware of risks involved  We will obtain labs        ALLERGIES:  Allergies   Allergen Reactions    Hydromorphone Other (See Comments)     hypotension  hypotension       CURRENT MEDICATIONS:    Current Outpatient Medications:     fexofenadine (ALLEGRA) 180 MG tablet, Take 1 tablet by mouth daily, Disp: , Rfl:     metoprolol succinate (TOPROL-XL) 100 mg 24 hr tablet, Take 1 tablet (100 mg total) by mouth daily, Disp: 90 tablet, Rfl: 5    naproxen sodium (ALEVE) 220 MG tablet, Take 1 tablet by mouth every 8 (eight) hours, Disp: , Rfl:     sertraline (ZOLOFT) 100 mg tablet, Take 1 tablet (100 mg total) by mouth daily, Disp: 90 tablet, Rfl: 5    lisinopril-hydrochlorothiazide (PRINZIDE,ZESTORETIC) 20-12 5 MG per tablet, Take 2 tablets by mouth daily, Disp: 90 tablet, Rfl: 3    ACTIVE PROBLEM LIST:  Patient Active Problem List   Diagnosis    Venous ulcer of left lower extremity with varicose veins (HCC)    Acute blood loss anemia    Bleeding from varicose vein    Essential hypertension    Chronic venous hypertension (idiopathic) with ulcer and inflammation of left lower extremity (CODE) (HCC)    Morbid obesity with BMI of 50 0-59 9, adult ([facility] [address] )    Obstructive sleep apnea treated with continuous positive airway pressure (CPAP)       PAST MEDICAL HISTORY:  Past Medical History:   Diagnosis Date    Anxiety     CPAP (continuous positive airway pressure) dependence     Hypertension     Sleep apnea     Venous stasis ulcers of both lower extremities (HCC)        PAST SURGICAL HISTORY:  Past Surgical History:   Procedure Laterality Date    HERNIA REPAIR      umbilical    NH ENDOVENOUS LASER, 1ST VEIN Left 8/12/2021    Procedure: ENDOVASCULAR LASER THERAPY (EVLT), Left GSV;  Surgeon: Jie Rivera MD;  Location: [facility];  Service: Vascular    TONSILLECTOMY         FAMILY HISTORY:  History reviewed  No pertinent family history  SOCIAL HISTORY:  Social History     Socioeconomic History    Marital status: /Civil Union     Spouse name: Not on file    Number of children: Not on file    Years of education: Not on file    Highest education level: Not on file   Occupational History    Not on file   Tobacco Use    Smoking status: Never Smoker    Smokeless tobacco: Never Used   Vaping Use    Vaping Use: Never used   Substance and Sexual Activity    Alcohol use: Yes     Alcohol/week: 3 0 standard drinks     Types: 2 Cans of beer, 1 Shots of liquor per week     Comment: daily    Drug use: Never    Sexual activity: Not on file   Other Topics Concern    Not on file   Social History Narrative    Not on file     Social Determinants of Health     Financial Resource Strain: Not on file   Food Insecurity: Not on file   Transportation Needs: Not on file   Physical Activity: Not on file   Stress: Not on file   Social Connections: Not on file   Intimate Partner Violence: Not on file   Housing Stability: Not on file       Review of Systems   Musculoskeletal: Positive for gait problem  Psychiatric/Behavioral: The patient is nervous/anxious  All other systems reviewed and are negative          Objective:  Vitals:    02/22/22 1405   BP: 142/86   BP Location: Right arm   Patient Position: Sitting   Cuff Size: Large   Pulse: 73   Temp: 98 °F (36 7 °C)   TempSrc: Temporal   SpO2: 96%   Weight: (!) 177 kg (391 lb)   Height: 5' 10" (1 778 m)     Body mass index is 56 1 kg/m²  Physical Exam  Vitals and nursing note reviewed  Constitutional:       Appearance: Normal appearance  He is obese  HENT:      Head: Normocephalic and atraumatic  Right Ear: Tympanic membrane normal       Left Ear: Tympanic membrane normal    Cardiovascular:      Rate and Rhythm: Normal rate and regular rhythm  Heart sounds: Normal heart sounds  Pulmonary:      Effort: Pulmonary effort is normal       Breath sounds: Normal breath sounds  Abdominal:      General: Bowel sounds are normal       Palpations: Abdomen is soft  Musculoskeletal:         General: Normal range of motion  Cervical back: Normal range of motion  Right lower leg: Edema present  Left lower leg: Edema present  Lymphadenopathy:      Cervical: No cervical adenopathy  Skin:     General: Skin is warm and dry  Findings: Lesion present  Neurological:      General: No focal deficit present  Mental Status: He is alert and oriented to person, place, and time  Mental status is at baseline  Gait: Gait abnormal    Psychiatric:         Mood and Affect: Mood normal            RESULTS:    No results found for this or any previous visit (from the past 1008 hour(s))  This note was created with voice recognition software  Phonic, grammatical and spelling errors may be present within the note as a result  Home

## 2023-11-15 NOTE — CONSULT NOTE ADULT - SUBJECTIVE AND OBJECTIVE BOX
HPI:  44 yo M w/ heartburn & h/o gastric bypass for morbid obesity (2006, at [facility]) w/ prophylactic IVC filter placed prior to that surgery, who is admitted to Medicine with anemia to H/H 6.1/23.5 requiring 4 u pRBC to become 7.4/26.5 then 8.3/28.5 on recheck. Upper endoscopy performed today demonstrated a 2 cm marginal ulcer without stigmata of bleeding, where he is started on clears, BID PPI & Carafate. Patient does not think he was ever on a PPI after bypass. States today was his third upper endoscopy after developing heartburn 8 years ago, self-medicating with TUMS. Denies any fevers, chills, food intolerance, dark stools.      ROS: 14 systems reviewed with pertinent positives and negatives as above.    PAST MEDICAL & SURGICAL HISTORY:  Admits to alcohol use      H/O gastric bypass      S/P IVC filter          MEDICATIONS  (STANDING):  pantoprazole  Injectable 40 milliGRAM(s) IV Push two times a day  sucralfate suspension 1 Gram(s) Oral four times a day    MEDICATIONS  (PRN):  ondansetron Injectable 4 milliGRAM(s) IV Push every 6 hours PRN Nausea and/or Vomiting      Allergies    No Known Allergies    Intolerances        SOCIAL HISTORY: Nonsmoker, occasional EtOH, denies illicits    FAMILY HISTORY:  FH: CAD (coronary artery disease) (Father)    Family history of CVA (Father)            Physical Exam:  GENERAL: NAD, well developed, well nourished  HEAD: Atraumatic, normocephalic  EYES: EOMI, PERRLA, conjunctiva and sclera clear  ENT: moist mucous membrane  NECK: supple, No JVD, midline trachea  CHEST/LUNG: No increased WOB, symmetric excursions  Heart: RRR ppp, no peripheral edema  ABDOMEN: round, soft, nondistended, nontender. no organomegaly  EXTREMITIES: +2 peripheral pulses, brisk cap refill. no clubbing, cyanosis or edema  NERVOUS SYSTEM: AOx4, speech clear, no neuro-deficits  MSK: full ROM, no deformities  SKIN: warm to touch, no rash or lesions        Vital Signs Last 24 Hrs  T(C): 37.1 (15 Nov 2023 09:29), Max: 37.4 (15 Nov 2023 06:45)  T(F): 98.8 (15 Nov 2023 09:29), Max: 99.3 (15 Nov 2023 06:45)  HR: 81 (15 Nov 2023 09:29) (71 - 102)  BP: 151/88 (15 Nov 2023 09:29) (135/79 - 153/75)  BP(mean): --  RR: 18 (15 Nov 2023 09:29) (14 - 18)  SpO2: 100% (15 Nov 2023 09:29) (98% - 100%)    Parameters below as of 15 Nov 2023 09:29  Patient On (Oxygen Delivery Method): room air        I&O's Summary    15 Nov 2023 07:01  -  15 Nov 2023 19:19  --------------------------------------------------------  IN: 282 mL / OUT: 0 mL / NET: 282 mL            LABS:                        8.3    x     )-----------( x        ( 15 Nov 2023 14:45 )             28.5     11-15    136  |  102  |  13  ----------------------------<  98  3.6   |  28  |  0.66    Ca    8.4<L>      15 Nov 2023 06:53  Phos  3.5     11-15  Mg     1.6     11-15    TPro  7.8  /  Alb  3.6  /  TBili  0.6  /  DBili  x   /  AST  79<H>  /  ALT  52  /  AlkPhos  69  11-14    PT/INR - ( 14 Nov 2023 15:20 )   PT: 11.2 sec;   INR: 0.99 ratio         PTT - ( 14 Nov 2023 15:20 )  PTT:27.3 sec  Urinalysis Basic - ( 15 Nov 2023 06:53 )    Color: x / Appearance: x / SG: x / pH: x  Gluc: 98 mg/dL / Ketone: x  / Bili: x / Urobili: x   Blood: x / Protein: x / Nitrite: x   Leuk Esterase: x / RBC: x / WBC x   Sq Epi: x / Non Sq Epi: x / Bacteria: x      CAPILLARY BLOOD GLUCOSE        LIVER FUNCTIONS - ( 14 Nov 2023 15:20 )  Alb: 3.6 g/dL / Pro: 7.8 gm/dL / ALK PHOS: 69 U/L / ALT: 52 U/L / AST: 79 U/L / GGT: x                 RADIOLOGY & ADDITIONAL STUDIES:    11/15/23: Endoscopy brief op note    Operative Findings:  · Operative Findings  2 cm ulcer at the anastomosis (marginal ulcer) without high risk bleeding stigmata.    Specimens/Blood Loss/IV/Output/Protocol/VTE:   Specimens/Blood Loss/IV/Output/Protocol/VTE:  · Specimens  none  · Estimated Blood Loss  0 milliLiter(s)      Other Details/Condition Post op/Disposition:  · Comments/Other Details  clears today  diet tomorrow  PPI BID  carafate 1g liquid four times daily  · Condition Post op  good  · Disposition  [facility] then [facility]

## 2023-11-15 NOTE — PATIENT PROFILE ADULT - FALL HARM RISK - RISK INTERVENTIONS

## 2023-11-16 ENCOUNTER — TRANSCRIPTION ENCOUNTER (OUTPATIENT)
Age: 43
End: 2023-11-16

## 2023-11-16 VITALS
RESPIRATION RATE: 18 BRPM | DIASTOLIC BLOOD PRESSURE: 82 MMHG | HEART RATE: 74 BPM | OXYGEN SATURATION: 100 % | TEMPERATURE: 99 F | SYSTOLIC BLOOD PRESSURE: 138 MMHG

## 2023-11-16 LAB
ADD ON TEST-SPECIMEN IN LAB: SIGNIFICANT CHANGE UP
ADD ON TEST-SPECIMEN IN LAB: SIGNIFICANT CHANGE UP
ANION GAP SERPL CALC-SCNC: 5 MMOL/L — SIGNIFICANT CHANGE UP (ref 5–17)
ANION GAP SERPL CALC-SCNC: 5 MMOL/L — SIGNIFICANT CHANGE UP (ref 5–17)
BASOPHILS # BLD AUTO: 0.04 K/UL — SIGNIFICANT CHANGE UP (ref 0–0.2)
BASOPHILS # BLD AUTO: 0.04 K/UL — SIGNIFICANT CHANGE UP (ref 0–0.2)
BASOPHILS NFR BLD AUTO: 0.8 % — SIGNIFICANT CHANGE UP (ref 0–2)
BASOPHILS NFR BLD AUTO: 0.8 % — SIGNIFICANT CHANGE UP (ref 0–2)
BUN SERPL-MCNC: 9 MG/DL — SIGNIFICANT CHANGE UP (ref 7–23)
BUN SERPL-MCNC: 9 MG/DL — SIGNIFICANT CHANGE UP (ref 7–23)
CALCIUM SERPL-MCNC: 8.7 MG/DL — SIGNIFICANT CHANGE UP (ref 8.5–10.1)
CALCIUM SERPL-MCNC: 8.7 MG/DL — SIGNIFICANT CHANGE UP (ref 8.5–10.1)
CHLORIDE SERPL-SCNC: 103 MMOL/L — SIGNIFICANT CHANGE UP (ref 96–108)
CHLORIDE SERPL-SCNC: 103 MMOL/L — SIGNIFICANT CHANGE UP (ref 96–108)
CO2 SERPL-SCNC: 28 MMOL/L — SIGNIFICANT CHANGE UP (ref 22–31)
CO2 SERPL-SCNC: 28 MMOL/L — SIGNIFICANT CHANGE UP (ref 22–31)
CREAT SERPL-MCNC: 0.62 MG/DL — SIGNIFICANT CHANGE UP (ref 0.5–1.3)
CREAT SERPL-MCNC: 0.62 MG/DL — SIGNIFICANT CHANGE UP (ref 0.5–1.3)
EGFR: 122 ML/MIN/1.73M2 — SIGNIFICANT CHANGE UP
EGFR: 122 ML/MIN/1.73M2 — SIGNIFICANT CHANGE UP
EOSINOPHIL # BLD AUTO: 0.15 K/UL — SIGNIFICANT CHANGE UP (ref 0–0.5)
EOSINOPHIL # BLD AUTO: 0.15 K/UL — SIGNIFICANT CHANGE UP (ref 0–0.5)
EOSINOPHIL NFR BLD AUTO: 2.9 % — SIGNIFICANT CHANGE UP (ref 0–6)
EOSINOPHIL NFR BLD AUTO: 2.9 % — SIGNIFICANT CHANGE UP (ref 0–6)
GLUCOSE SERPL-MCNC: 98 MG/DL — SIGNIFICANT CHANGE UP (ref 70–99)
GLUCOSE SERPL-MCNC: 98 MG/DL — SIGNIFICANT CHANGE UP (ref 70–99)
HCT VFR BLD CALC: 29.6 % — LOW (ref 39–50)
HCT VFR BLD CALC: 29.6 % — LOW (ref 39–50)
HGB BLD-MCNC: 8.5 G/DL — LOW (ref 13–17)
HGB BLD-MCNC: 8.5 G/DL — LOW (ref 13–17)
IMM GRANULOCYTES NFR BLD AUTO: 0.2 % — SIGNIFICANT CHANGE UP (ref 0–0.9)
IMM GRANULOCYTES NFR BLD AUTO: 0.2 % — SIGNIFICANT CHANGE UP (ref 0–0.9)
LYMPHOCYTES # BLD AUTO: 0.52 K/UL — LOW (ref 1–3.3)
LYMPHOCYTES # BLD AUTO: 0.52 K/UL — LOW (ref 1–3.3)
LYMPHOCYTES # BLD AUTO: 10 % — LOW (ref 13–44)
LYMPHOCYTES # BLD AUTO: 10 % — LOW (ref 13–44)
MAGNESIUM SERPL-MCNC: 1.9 MG/DL — SIGNIFICANT CHANGE UP (ref 1.6–2.6)
MAGNESIUM SERPL-MCNC: 1.9 MG/DL — SIGNIFICANT CHANGE UP (ref 1.6–2.6)
MCHC RBC-ENTMCNC: 21.7 PG — LOW (ref 27–34)
MCHC RBC-ENTMCNC: 21.7 PG — LOW (ref 27–34)
MCHC RBC-ENTMCNC: 28.7 GM/DL — LOW (ref 32–36)
MCHC RBC-ENTMCNC: 28.7 GM/DL — LOW (ref 32–36)
MCV RBC AUTO: 75.5 FL — LOW (ref 80–100)
MCV RBC AUTO: 75.5 FL — LOW (ref 80–100)
MONOCYTES # BLD AUTO: 0.52 K/UL — SIGNIFICANT CHANGE UP (ref 0–0.9)
MONOCYTES # BLD AUTO: 0.52 K/UL — SIGNIFICANT CHANGE UP (ref 0–0.9)
MONOCYTES NFR BLD AUTO: 10 % — SIGNIFICANT CHANGE UP (ref 2–14)
MONOCYTES NFR BLD AUTO: 10 % — SIGNIFICANT CHANGE UP (ref 2–14)
NEUTROPHILS # BLD AUTO: 3.94 K/UL — SIGNIFICANT CHANGE UP (ref 1.8–7.4)
NEUTROPHILS # BLD AUTO: 3.94 K/UL — SIGNIFICANT CHANGE UP (ref 1.8–7.4)
NEUTROPHILS NFR BLD AUTO: 76.1 % — SIGNIFICANT CHANGE UP (ref 43–77)
NEUTROPHILS NFR BLD AUTO: 76.1 % — SIGNIFICANT CHANGE UP (ref 43–77)
PHOSPHATE SERPL-MCNC: 3 MG/DL — SIGNIFICANT CHANGE UP (ref 2.5–4.5)
PHOSPHATE SERPL-MCNC: 3 MG/DL — SIGNIFICANT CHANGE UP (ref 2.5–4.5)
PLATELET # BLD AUTO: 171 K/UL — SIGNIFICANT CHANGE UP (ref 150–400)
PLATELET # BLD AUTO: 171 K/UL — SIGNIFICANT CHANGE UP (ref 150–400)
POTASSIUM SERPL-MCNC: 3.7 MMOL/L — SIGNIFICANT CHANGE UP (ref 3.5–5.3)
POTASSIUM SERPL-MCNC: 3.7 MMOL/L — SIGNIFICANT CHANGE UP (ref 3.5–5.3)
POTASSIUM SERPL-SCNC: 3.7 MMOL/L — SIGNIFICANT CHANGE UP (ref 3.5–5.3)
POTASSIUM SERPL-SCNC: 3.7 MMOL/L — SIGNIFICANT CHANGE UP (ref 3.5–5.3)
RBC # BLD: 3.92 M/UL — LOW (ref 4.2–5.8)
RBC # BLD: 3.92 M/UL — LOW (ref 4.2–5.8)
RBC # FLD: 22.4 % — HIGH (ref 10.3–14.5)
RBC # FLD: 22.4 % — HIGH (ref 10.3–14.5)
SODIUM SERPL-SCNC: 136 MMOL/L — SIGNIFICANT CHANGE UP (ref 135–145)
SODIUM SERPL-SCNC: 136 MMOL/L — SIGNIFICANT CHANGE UP (ref 135–145)
WBC # BLD: 5.18 K/UL — SIGNIFICANT CHANGE UP (ref 3.8–10.5)
WBC # BLD: 5.18 K/UL — SIGNIFICANT CHANGE UP (ref 3.8–10.5)
WBC # FLD AUTO: 5.18 K/UL — SIGNIFICANT CHANGE UP (ref 3.8–10.5)
WBC # FLD AUTO: 5.18 K/UL — SIGNIFICANT CHANGE UP (ref 3.8–10.5)

## 2023-11-16 PROCEDURE — 99238 HOSP IP/OBS DSCHRG MGMT 30/<: CPT

## 2023-11-16 RX ORDER — SUCRALFATE 1 G
10 TABLET ORAL
Qty: 1200 | Refills: 0
Start: 2023-11-16 | End: 2023-12-15

## 2023-11-16 RX ORDER — PANTOPRAZOLE SODIUM 20 MG/1
1 TABLET, DELAYED RELEASE ORAL
Qty: 60 | Refills: 0
Start: 2023-11-16 | End: 2023-12-15

## 2023-11-16 RX ORDER — PANTOPRAZOLE SODIUM 20 MG/1
40 TABLET, DELAYED RELEASE ORAL
Refills: 0 | Status: DISCONTINUED | OUTPATIENT
Start: 2023-11-16 | End: 2023-11-16

## 2023-11-16 RX ADMIN — Medication 1 GRAM(S): at 12:32

## 2023-11-16 RX ADMIN — Medication 1 GRAM(S): at 00:15

## 2023-11-16 RX ADMIN — PANTOPRAZOLE SODIUM 40 MILLIGRAM(S): 20 TABLET, DELAYED RELEASE ORAL at 09:42

## 2023-11-16 RX ADMIN — Medication 1 GRAM(S): at 06:26

## 2023-11-16 NOTE — PROGRESS NOTE ADULT - ASSESSMENT
43 year old male with history gastric bypass, ulceration at anastomosis 2019 with symptomatic anemia and epigastric/chest pain    Imp: Symptomatic anemia 2/2 EGD finding- 2 cm ulcer at anastomosis    Rec:  ::PPI BID  ::Carafate liquid 1gm PO four times daily pre meals and at bedtime  ::Adv diet  ::Ok to dc to home

## 2023-11-16 NOTE — DISCHARGE NOTE PROVIDER - NSDCMRMEDTOKEN_GEN_ALL_CORE_FT
pantoprazole 40 mg oral delayed release tablet: 1 tab(s) orally 2 times a day  sucralfate 1 g/10 mL oral suspension: 10 milliliter(s) orally 4 times a day before meals and at bedtime

## 2023-11-16 NOTE — PROGRESS NOTE ADULT - SUBJECTIVE AND OBJECTIVE BOX
SURGERY DAILY PROGRESS NOTE:     Subjective:  Patient seen and examined at bedside during am rounds. Pt is tolerating his CLD diet. Pt is on PPI and sucralfate AVSS. Denies any fevers, chills, n/v/d, chest pain or shortness of breath    Objective:    MEDICATIONS  (STANDING):  melatonin 3 milliGRAM(s) Oral at bedtime  pantoprazole  Injectable 40 milliGRAM(s) IV Push two times a day  sucralfate suspension 1 Gram(s) Oral four times a day    MEDICATIONS  (PRN):  ondansetron Injectable 4 milliGRAM(s) IV Push every 6 hours PRN Nausea and/or Vomiting      Vital Signs Last 24 Hrs  T(C): 36.7 (15 Nov 2023 20:02), Max: 37.4 (15 Nov 2023 06:45)  T(F): 98 (15 Nov 2023 20:02), Max: 99.3 (15 Nov 2023 06:45)  HR: 77 (15 Nov 2023 20:02) (72 - 86)  BP: 147/83 (15 Nov 2023 20:02) (140/73 - 153/75)  BP(mean): --  RR: 18 (15 Nov 2023 20:02) (14 - 18)  SpO2: 99% (15 Nov 2023 20:02) (98% - 100%)    Parameters below as of 15 Nov 2023 20:02  Patient On (Oxygen Delivery Method): room air          PHYSICAL EXAM   Gen: well-appearing, in no acute distress  CV: pulse regularly present   Resp: airway patent, non-labored breathing  Abd: soft, NTND; no rebound or guarding   Ext. no cyanosis or edema      I&O's Detail    15 Nov 2023 07:01  -  16 Nov 2023 04:48  --------------------------------------------------------  IN:    PRBCs (Packed Red Blood Cells): 282 mL  Total IN: 282 mL    OUT:  Total OUT: 0 mL    Total NET: 282 mL          Daily     Daily     LABS:                        8.3    x     )-----------( x        ( 15 Nov 2023 14:45 )             28.5     11-15    136  |  102  |  13  ----------------------------<  98  3.6   |  28  |  0.66    Ca    8.4<L>      15 Nov 2023 06:53  Phos  3.5     11-15  Mg     1.6     11-15    TPro  7.8  /  Alb  3.6  /  TBili  0.6  /  DBili  x   /  AST  79<H>  /  ALT  52  /  AlkPhos  69  11-14    PT/INR - ( 14 Nov 2023 15:20 )   PT: 11.2 sec;   INR: 0.99 ratio         PTT - ( 14 Nov 2023 15:20 )  PTT:27.3 sec  Urinalysis Basic - ( 15 Nov 2023 06:53 )    Color: x / Appearance: x / SG: x / pH: x  Gluc: 98 mg/dL / Ketone: x  / Bili: x / Urobili: x   Blood: x / Protein: x / Nitrite: x   Leuk Esterase: x / RBC: x / WBC x   Sq Epi: x / Non Sq Epi: x / Bacteria: x            ASSESSMENT/PLAN:

## 2023-11-16 NOTE — DISCHARGE NOTE PROVIDER - NSDCCPCAREPLAN_GEN_ALL_CORE_FT
PRINCIPAL DISCHARGE DIAGNOSIS  Diagnosis: Symptomatic anemia  Assessment and Plan of Treatment: Admitted for anemia, transfused 4 units blood, found to have Found to have 2cm marginal ulcer on endoscopy. Plan to continue with pantoprazole 40mg twicw daily and carafate 4 times a day with meals and at bedtime. Follow up with gastroenterology for continued management.

## 2023-11-16 NOTE — PROGRESS NOTE ADULT - SUBJECTIVE AND OBJECTIVE BOX
Patient is a 43y old  Male who presents with a chief complaint of symptomatic anemia    Followup: anemia, epigastric pain  At bedside, patient denies pain or discomfort. Discussed EGD findings- 2 cm ulcer at anastomosis.     MEDICATIONS  (STANDING):  melatonin 3 milliGRAM(s) Oral at bedtime  pantoprazole    Tablet 40 milliGRAM(s) Oral two times a day  sucralfate suspension 1 Gram(s) Oral four times a day    MEDICATIONS  (PRN):  ondansetron Injectable 4 milliGRAM(s) IV Push every 6 hours PRN Nausea and/or Vomiting    Vital Signs Last 24 Hrs  T(C): 37.1 (16 Nov 2023 09:41), Max: 37.1 (16 Nov 2023 09:41)  T(F): 98.7 (16 Nov 2023 09:41), Max: 98.7 (16 Nov 2023 09:41)  HR: 74 (16 Nov 2023 09:41) (74 - 77)  BP: 138/82 (16 Nov 2023 09:41) (138/82 - 147/83)  BP(mean): --  RR: 18 (16 Nov 2023 09:41) (18 - 18)  SpO2: 100% (16 Nov 2023 09:41) (99% - 100%)    Parameters below as of 16 Nov 2023 09:41  Patient On (Oxygen Delivery Method): room air    PHYSICAL EXAM:    Constitutional: No acute distress, well-developed, non-toxic appearing  HEENT: masked, good phonation, not icteric  Neck: supple, no lymphadenopathy  Respiratory: clear to ascultation bilaterally, no wheezing  Cardiovascular: S1 and S2, regular rate and rhythm, no murmurs rubs or gallops  Gastrointestinal: soft, non-tender, non-distended, +bowel sounds, no rebound or guarding, no surgical scars, no drains  Extremities: No peripheral edema, no cyanosis or clubbing  Vascular: 2+ peripheral pulses, no venous stasis  Neurological: A/O x 3, no focal deficits, no asterixis  Psychiatric: Normal mood, normal affect  Skin: No rashes, not jaundiced    LABS:                        8.5    5.18  )-----------( 171      ( 16 Nov 2023 06:17 )             29.6     11-16    136  |  103  |  9   ----------------------------<  98  3.7   |  28  |  0.62    Ca    8.7      16 Nov 2023 06:17  Phos  3.0     11-16  Mg     1.9     11-16    TPro  7.8  /  Alb  3.6  /  TBili  0.6  /  DBili  x   /  AST  79<H>  /  ALT  52  /  AlkPhos  69  11-14    PT/INR - ( 14 Nov 2023 15:20 )   PT: 11.2 sec;   INR: 0.99 ratio         PTT - ( 14 Nov 2023 15:20 )  PTT:27.3 sec  LIVER FUNCTIONS - ( 14 Nov 2023 15:20 )  Alb: 3.6 g/dL / Pro: 7.8 gm/dL / ALK PHOS: 69 U/L / ALT: 52 U/L / AST: 79 U/L / GGT: x             RADIOLOGY & ADDITIONAL STUDIES:  EGD 11/15/23 : Operative Findings  2 cm ulcer at the anastomosis (marginal ulcer) without high risk bleeding stigmata.   Patient is a 43y old  Male who presents with a chief complaint of symptomatic anemia    Followup: anemia, epigastric pain  At bedside, patient denies pain or discomfort. Discussed EGD findings- 2 cm ulcer at anastomosis.     MEDICATIONS  (STANDING):  melatonin 3 milliGRAM(s) Oral at bedtime  pantoprazole    Tablet 40 milliGRAM(s) Oral two times a day  sucralfate suspension 1 Gram(s) Oral four times a day    MEDICATIONS  (PRN):  ondansetron Injectable 4 milliGRAM(s) IV Push every 6 hours PRN Nausea and/or Vomiting    Vital Signs Last 24 Hrs  T(C): 37.1 (16 Nov 2023 09:41), Max: 37.1 (16 Nov 2023 09:41)  T(F): 98.7 (16 Nov 2023 09:41), Max: 98.7 (16 Nov 2023 09:41)  HR: 74 (16 Nov 2023 09:41) (74 - 77)  BP: 138/82 (16 Nov 2023 09:41) (138/82 - 147/83)  BP(mean): --  RR: 18 (16 Nov 2023 09:41) (18 - 18)  SpO2: 100% (16 Nov 2023 09:41) (99% - 100%)    Parameters below as of 16 Nov 2023 09:41  Patient On (Oxygen Delivery Method): room air    PHYSICAL EXAM:    Constitutional: No acute distress, well-developed, non-toxic appearing  HEENT: unmasked, good phonation, not icteric  Neck: supple, no lymphadenopathy  Respiratory: clear to ascultation bilaterally, no wheezing  Cardiovascular: S1 and S2, regular rate and rhythm, no murmurs rubs or gallops  Gastrointestinal: soft, non-tender, non-distended, +bowel sounds, no rebound or guarding, no surgical scars, no drains  Extremities: No peripheral edema, no cyanosis or clubbing  Vascular: 2+ peripheral pulses, no venous stasis  Neurological: A/O x 3, no focal deficits, no asterixis  Psychiatric: Normal mood, normal affect  Skin: No rashes, not jaundiced    LABS:                        8.5    5.18  )-----------( 171      ( 16 Nov 2023 06:17 )             29.6     11-16    136  |  103  |  9   ----------------------------<  98  3.7   |  28  |  0.62    Ca    8.7      16 Nov 2023 06:17  Phos  3.0     11-16  Mg     1.9     11-16    TPro  7.8  /  Alb  3.6  /  TBili  0.6  /  DBili  x   /  AST  79<H>  /  ALT  52  /  AlkPhos  69  11-14    PT/INR - ( 14 Nov 2023 15:20 )   PT: 11.2 sec;   INR: 0.99 ratio         PTT - ( 14 Nov 2023 15:20 )  PTT:27.3 sec  LIVER FUNCTIONS - ( 14 Nov 2023 15:20 )  Alb: 3.6 g/dL / Pro: 7.8 gm/dL / ALK PHOS: 69 U/L / ALT: 52 U/L / AST: 79 U/L / GGT: x             RADIOLOGY & ADDITIONAL STUDIES:  EGD 11/15/23 : Operative Findings  2 cm ulcer at the anastomosis (marginal ulcer) without high risk bleeding stigmata.

## 2023-11-16 NOTE — PROGRESS NOTE ADULT - ASSESSMENT
42 yo M w/ heartburn & h/o gastric bypass for morbid obesity (2006, at [facility]) w/ prophylactic IVC filter placed prior to that surgery, whose symptomatic anemia is likely to marginal ulcer that was not actively bleeding during endoscopy today. Stable while awaiting diet advancement and ulcer medical management    doing well,   tolerating CLD    P:  cont CLD  cont PPI, carafate  adv diet per GI  -Serial exam  -trend H/H  -If H/H stable, likely to be sufficiently management medically and can f/u with office 2 weeks after discharge  rest of plan as per primary team    Discussed with Dr. Rowan

## 2023-11-16 NOTE — PROGRESS NOTE ADULT - REASON FOR ADMISSION
symptomatic anemia  epigastric pain
Symptomatic anemia, epigastric discomfort, concern for possible PUD and GI bleeding
symptomatic anemia  epigastric pain

## 2023-11-16 NOTE — DISCHARGE NOTE NURSING/CASE MANAGEMENT/SOCIAL WORK - PATIENT PORTAL LINK FT
You can access the FollowMyHealth Patient Portal offered by [facility] by registering at the following website: [email or web address]. By joining [facility]’s FollowMyHealth portal, you will also be able to view your health information using other applications (apps) compatible with our system.

## 2023-11-16 NOTE — DISCHARGE NOTE PROVIDER - HOSPITAL COURSE
43 year old man with hx of Addis-en-Y gastric bypass in 2006, prophylactic IVC filter placement at that time, hx of rib fracture with traumatic pneumothorax in 2022, hx of bleeding peptic ulcer thought to be related to alcohol and NSAID use, sent to  ED by outpatient provider on 11/14 when patient was noted to have low Hgb on blood work. The blood work was initially sent to further evaluate recent complaints of fatigue, decreased exercise tolerance, and epigastric discomfort worsened with food intake and relieved with TUMS. He still consumes alcohol but denies heavy use. Last drank this past Saturday, 2 cocktails while out in [facility]. In the ED, BP was 145/96, , other vitals WNL. Exam was notable for pallor, mild epigastric tenderness without rebound or guarding. Hgb was 6.1. Plts WNL. Coags WNL. Troponin negative. Patient was ordered for PRBC transfusions, IV PPI. Admitted to Medicine.    Symptomatic anemia, marginal ulcer albeit without high risk bleeding stigmata   Patient is now s/p 4u PRBCs. H/H today 8.6/26.9. Feeling improved. No overt bleeding at this point. No dizziness, lightheadedness or palpitations. Mild epigastric discomfort. Underwent EGD 11/15. Found to have 2cm marginal ulcer at anastomosis without high risk bleeding stigmata (hx of gastric bypass). Appreciate input from GI   - Continue PPI, now BID  - Begin Carafate QID  - Tolerating diet

## 2023-11-16 NOTE — DISCHARGE NOTE PROVIDER - ATTENDING DISCHARGE PHYSICAL EXAMINATION:
Patient seen and examined at bedside, feels well, no complaints, tolerating orally.     Vital Signs Last 24 Hrs  T(C): 37.1 (16 Nov 2023 09:41), Max: 37.1 (16 Nov 2023 09:41)  T(F): 98.7 (16 Nov 2023 09:41), Max: 98.7 (16 Nov 2023 09:41)  HR: 74 (16 Nov 2023 09:41) (74 - 77)  BP: 138/82 (16 Nov 2023 09:41) (138/82 - 147/83)  RR: 18 (16 Nov 2023 09:41) (18 - 18)  SpO2: 100% (16 Nov 2023 09:41) (99% - 100%)    Parameters below as of 16 Nov 2023 09:41  Patient On (Oxygen Delivery Method): room air    PHYSICAL EXAM:  GENERAL: NAD, lying in bed comfortably  HEAD:  Atraumatic, Normocephalic  EYES: conjunctiva and sclera clear  ENT: Moist mucous membranes  NECK: Supple, No JVD  CHEST/LUNG: Clear to auscultation bilaterally; No rales, rhonchi, wheezing. Unlabored respirations  HEART: Regular rate and rhythm; No murmurs  ABDOMEN: Bowel sounds present; Soft, Nontender, Nondistended.   EXTREMITIES:  2+ Peripheral Pulses, brisk capillary refill. No clubbing, cyanosis, or edema  NERVOUS SYSTEM:  Alert & Oriented X3, speech clear. No deficits   MSK: FROM all 4 extremities, full and equal strength

## 2023-11-16 NOTE — DISCHARGE NOTE PROVIDER - CARE PROVIDER_API CALL
Jonny Tijerina  Gastroenterology  [address] [place] [address] [place] [address]  Phone: [phone]  Fax: [phone]  Follow Up Time:

## 2023-11-21 DIAGNOSIS — F10.90 ALCOHOL USE, UNSPECIFIED, UNCOMPLICATED: ICD-10-CM

## 2023-11-21 DIAGNOSIS — Z98.84 BARIATRIC SURGERY STATUS: ICD-10-CM

## 2023-11-21 DIAGNOSIS — D64.9 ANEMIA, UNSPECIFIED: ICD-10-CM

## 2023-11-21 DIAGNOSIS — K28.9 GASTROJEJUNAL ULCER, UNSPECIFIED AS ACUTE OR CHRONIC, WITHOUT HEMORRHAGE OR PERFORATION: ICD-10-CM

## 2024-01-31 NOTE — PROGRESS NOTE ADULT - CARDIOVASCULAR
detailed exam I attest my time as attending is greater than 50% of the total combined time spent on qualifying patient care activities by the PA/NP and attending.

## 2024-07-30 ENCOUNTER — NON-APPOINTMENT (OUTPATIENT)
Age: 44
End: 2024-07-30

## 2024-12-09 ENCOUNTER — NON-APPOINTMENT (OUTPATIENT)
Age: 44
End: 2024-12-09

## 2025-06-18 NOTE — ED ADULT NURSE NOTE - BEFAST SPEECH SLURRED
No

## 2025-06-27 NOTE — PATIENT PROFILE ADULT - NSPRESCRALCFREQ_GEN_A_NUR
Bladder instillation. Urethra prepped with betadine and 12 fr in and out catheter inserted using aseptic technique obtained scant amount of clear yellow urine. Bladder medication instilled and tolerated well. Will return next week. Patient is requesting to speak with provider with concerns of CT scan.     
2-3 times a week